# Patient Record
Sex: FEMALE | Race: WHITE | NOT HISPANIC OR LATINO | Employment: STUDENT | URBAN - NONMETROPOLITAN AREA
[De-identification: names, ages, dates, MRNs, and addresses within clinical notes are randomized per-mention and may not be internally consistent; named-entity substitution may affect disease eponyms.]

---
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
## 2023-02-22 ENCOUNTER — TRANSCRIBE ORDERS (OUTPATIENT)
Dept: LAB | Facility: HOSPITAL | Age: 23
End: 2023-02-22
Payer: COMMERCIAL

## 2023-02-22 ENCOUNTER — LAB (OUTPATIENT)
Dept: LAB | Facility: HOSPITAL | Age: 23
End: 2023-02-22
Payer: COMMERCIAL

## 2023-02-22 DIAGNOSIS — N92.6 IRREGULAR MENSTRUAL CYCLE: Primary | ICD-10-CM

## 2023-02-22 DIAGNOSIS — N92.6 IRREGULAR MENSTRUAL CYCLE: ICD-10-CM

## 2023-02-22 LAB
25(OH)D3 SERPL-MCNC: 26.3 NG/ML (ref 30–100)
HCG INTACT+B SERPL-ACNC: 122 MIU/ML
PROGEST SERPL-MCNC: 16.6 NG/ML

## 2023-02-22 PROCEDURE — 84702 CHORIONIC GONADOTROPIN TEST: CPT

## 2023-02-22 PROCEDURE — 36415 COLL VENOUS BLD VENIPUNCTURE: CPT

## 2023-02-22 PROCEDURE — 82306 VITAMIN D 25 HYDROXY: CPT

## 2023-02-22 PROCEDURE — 84144 ASSAY OF PROGESTERONE: CPT

## 2023-02-24 ENCOUNTER — TRANSCRIBE ORDERS (OUTPATIENT)
Dept: LAB | Facility: HOSPITAL | Age: 23
End: 2023-02-24
Payer: COMMERCIAL

## 2023-02-24 ENCOUNTER — LAB (OUTPATIENT)
Dept: LAB | Facility: HOSPITAL | Age: 23
End: 2023-02-24
Payer: COMMERCIAL

## 2023-02-24 DIAGNOSIS — N92.6 IRREGULAR MENSTRUAL CYCLE: ICD-10-CM

## 2023-02-24 DIAGNOSIS — N92.6 IRREGULAR MENSTRUAL CYCLE: Primary | ICD-10-CM

## 2023-02-24 LAB — HCG INTACT+B SERPL-ACNC: 328 MIU/ML

## 2023-02-24 PROCEDURE — 36415 COLL VENOUS BLD VENIPUNCTURE: CPT

## 2023-02-24 PROCEDURE — 84702 CHORIONIC GONADOTROPIN TEST: CPT

## 2023-04-11 ENCOUNTER — LAB (OUTPATIENT)
Dept: LAB | Facility: HOSPITAL | Age: 23
End: 2023-04-11
Payer: COMMERCIAL

## 2023-04-11 ENCOUNTER — TRANSCRIBE ORDERS (OUTPATIENT)
Dept: LAB | Facility: HOSPITAL | Age: 23
End: 2023-04-11
Payer: COMMERCIAL

## 2023-04-11 DIAGNOSIS — Z3A.12 12 WEEKS GESTATION OF PREGNANCY: ICD-10-CM

## 2023-04-11 DIAGNOSIS — Z3A.12 12 WEEKS GESTATION OF PREGNANCY: Primary | ICD-10-CM

## 2023-04-11 LAB
25(OH)D3 SERPL-MCNC: 34 NG/ML (ref 30–100)
ABO GROUP BLD: NORMAL
ALBUMIN SERPL-MCNC: 4 G/DL (ref 3.5–5.2)
ALBUMIN/GLOB SERPL: 1.1 G/DL
ALP SERPL-CCNC: 57 U/L (ref 39–117)
ALT SERPL W P-5'-P-CCNC: 11 U/L (ref 1–33)
ANION GAP SERPL CALCULATED.3IONS-SCNC: 13.9 MMOL/L (ref 5–15)
AST SERPL-CCNC: 18 U/L (ref 1–32)
BACTERIA UR QL AUTO: ABNORMAL /HPF
BILIRUB SERPL-MCNC: 0.3 MG/DL (ref 0–1.2)
BILIRUB UR QL STRIP: NEGATIVE
BLD GP AB SCN SERPL QL: NEGATIVE
BUN SERPL-MCNC: 7 MG/DL (ref 6–20)
BUN/CREAT SERPL: 11.9 (ref 7–25)
CALCIUM SPEC-SCNC: 10 MG/DL (ref 8.6–10.5)
CHLORIDE SERPL-SCNC: 101 MMOL/L (ref 98–107)
CLARITY UR: ABNORMAL
CO2 SERPL-SCNC: 22.1 MMOL/L (ref 22–29)
COLOR UR: YELLOW
CREAT SERPL-MCNC: 0.59 MG/DL (ref 0.57–1)
DEPRECATED RDW RBC AUTO: 44.4 FL (ref 37–54)
EGFRCR SERPLBLD CKD-EPI 2021: 131.7 ML/MIN/1.73
ERYTHROCYTE [DISTWIDTH] IN BLOOD BY AUTOMATED COUNT: 14.6 % (ref 12.3–15.4)
GLOBULIN UR ELPH-MCNC: 3.5 GM/DL
GLUCOSE SERPL-MCNC: 83 MG/DL (ref 65–99)
GLUCOSE UR STRIP-MCNC: NEGATIVE MG/DL
HBV SURFACE AG SERPL QL IA: NORMAL
HCT VFR BLD AUTO: 40.1 % (ref 34–46.6)
HCV AB SER DONR QL: NORMAL
HGB BLD-MCNC: 13.6 G/DL (ref 12–15.9)
HGB UR QL STRIP.AUTO: NEGATIVE
HIV1+2 AB SER QL: NORMAL
HYALINE CASTS UR QL AUTO: ABNORMAL /LPF
KETONES UR QL STRIP: NEGATIVE
LEUKOCYTE ESTERASE UR QL STRIP.AUTO: ABNORMAL
MCH RBC QN AUTO: 28.2 PG (ref 26.6–33)
MCHC RBC AUTO-ENTMCNC: 33.9 G/DL (ref 31.5–35.7)
MCV RBC AUTO: 83.2 FL (ref 79–97)
NITRITE UR QL STRIP: NEGATIVE
PH UR STRIP.AUTO: 7 [PH] (ref 5–8)
PLATELET # BLD AUTO: 413 10*3/MM3 (ref 140–450)
PMV BLD AUTO: 10.2 FL (ref 6–12)
POTASSIUM SERPL-SCNC: 4 MMOL/L (ref 3.5–5.2)
PROT SERPL-MCNC: 7.5 G/DL (ref 6–8.5)
PROT UR QL STRIP: NEGATIVE
RBC # BLD AUTO: 4.82 10*6/MM3 (ref 3.77–5.28)
RBC # UR STRIP: ABNORMAL /HPF
REF LAB TEST METHOD: ABNORMAL
RH BLD: POSITIVE
SODIUM SERPL-SCNC: 137 MMOL/L (ref 136–145)
SP GR UR STRIP: 1.01 (ref 1–1.03)
SQUAMOUS #/AREA URNS HPF: ABNORMAL /HPF
UROBILINOGEN UR QL STRIP: ABNORMAL
WBC # UR STRIP: ABNORMAL /HPF
WBC NRBC COR # BLD: 11.24 10*3/MM3 (ref 3.4–10.8)

## 2023-04-11 PROCEDURE — 86592 SYPHILIS TEST NON-TREP QUAL: CPT

## 2023-04-11 PROCEDURE — 86901 BLOOD TYPING SEROLOGIC RH(D): CPT

## 2023-04-11 PROCEDURE — 87491 CHLMYD TRACH DNA AMP PROBE: CPT

## 2023-04-11 PROCEDURE — 82306 VITAMIN D 25 HYDROXY: CPT

## 2023-04-11 PROCEDURE — 81001 URINALYSIS AUTO W/SCOPE: CPT

## 2023-04-11 PROCEDURE — 85027 COMPLETE CBC AUTOMATED: CPT

## 2023-04-11 PROCEDURE — 87086 URINE CULTURE/COLONY COUNT: CPT

## 2023-04-11 PROCEDURE — 86803 HEPATITIS C AB TEST: CPT

## 2023-04-11 PROCEDURE — 80053 COMPREHEN METABOLIC PANEL: CPT

## 2023-04-11 PROCEDURE — 87591 N.GONORRHOEAE DNA AMP PROB: CPT

## 2023-04-11 PROCEDURE — 86762 RUBELLA ANTIBODY: CPT

## 2023-04-11 PROCEDURE — 36415 COLL VENOUS BLD VENIPUNCTURE: CPT

## 2023-04-11 PROCEDURE — 87340 HEPATITIS B SURFACE AG IA: CPT

## 2023-04-11 PROCEDURE — 86850 RBC ANTIBODY SCREEN: CPT

## 2023-04-11 PROCEDURE — 86900 BLOOD TYPING SEROLOGIC ABO: CPT

## 2023-04-11 PROCEDURE — G0432 EIA HIV-1/HIV-2 SCREEN: HCPCS

## 2023-04-12 LAB
BACTERIA SPEC AEROBE CULT: NORMAL
RPR SER QL: NORMAL
RUBV IGG SERPL IA-ACNC: 4.36 INDEX

## 2023-04-13 LAB — BACTERIA SPEC AEROBE CULT: NORMAL

## 2023-04-15 LAB
C TRACH RRNA SPEC QL NAA+PROBE: NEGATIVE
N GONORRHOEA RRNA SPEC QL NAA+PROBE: NEGATIVE

## 2023-06-15 ENCOUNTER — TRANSCRIBE ORDERS (OUTPATIENT)
Dept: OBSTETRICS AND GYNECOLOGY | Facility: HOSPITAL | Age: 23
End: 2023-06-15
Payer: COMMERCIAL

## 2023-06-15 DIAGNOSIS — E66.9 SUPER OBESITY: ICD-10-CM

## 2023-06-15 DIAGNOSIS — E28.2 PCOS (POLYCYSTIC OVARIAN SYNDROME): ICD-10-CM

## 2023-06-15 DIAGNOSIS — Z36.3 ANTENATAL SCREENING FOR MALFORMATION USING ULTRASONICS: Primary | ICD-10-CM

## 2023-06-15 DIAGNOSIS — O10.919 HTN IN PREGNANCY, CHRONIC: ICD-10-CM

## 2023-07-12 PROBLEM — E28.2 PCOS (POLYCYSTIC OVARIAN SYNDROME): Status: ACTIVE | Noted: 2023-07-12

## 2023-07-12 PROBLEM — E66.01 MORBID OBESITY WITH BMI OF 50.0-59.9, ADULT: Status: ACTIVE | Noted: 2023-07-12

## 2023-07-12 PROBLEM — O10.919 CHRONIC HYPERTENSION AFFECTING PREGNANCY: Status: ACTIVE | Noted: 2023-07-12

## 2023-07-12 PROBLEM — Z82.79 FAMILY HISTORY OF OTHER CONGENITAL MALFORMATIONS, DEFORMATIONS AND CHROMOSOMAL ABNORMALITIES: Status: ACTIVE | Noted: 2023-07-12

## 2023-08-01 ENCOUNTER — OFFICE VISIT (OUTPATIENT)
Dept: OBSTETRICS AND GYNECOLOGY | Facility: HOSPITAL | Age: 23
End: 2023-08-01
Payer: COMMERCIAL

## 2023-08-01 ENCOUNTER — HOSPITAL ENCOUNTER (OUTPATIENT)
Dept: WOMENS IMAGING | Facility: HOSPITAL | Age: 23
Discharge: HOME | End: 2023-08-01
Payer: COMMERCIAL

## 2023-08-01 ENCOUNTER — LAB (OUTPATIENT)
Dept: LAB | Facility: HOSPITAL | Age: 23
End: 2023-08-01
Payer: COMMERCIAL

## 2023-08-01 VITALS — WEIGHT: 293 LBS | DIASTOLIC BLOOD PRESSURE: 74 MMHG | SYSTOLIC BLOOD PRESSURE: 127 MMHG

## 2023-08-01 DIAGNOSIS — E28.2 PCOS (POLYCYSTIC OVARIAN SYNDROME): ICD-10-CM

## 2023-08-01 DIAGNOSIS — E28.2 PCOS (POLYCYSTIC OVARIAN SYNDROME): Primary | ICD-10-CM

## 2023-08-01 DIAGNOSIS — E66.01 MORBID OBESITY WITH BMI OF 50.0-59.9, ADULT: ICD-10-CM

## 2023-08-01 DIAGNOSIS — Z3A.27 27 WEEKS GESTATION OF PREGNANCY: ICD-10-CM

## 2023-08-01 DIAGNOSIS — O10.919 CHRONIC HYPERTENSION AFFECTING PREGNANCY: ICD-10-CM

## 2023-08-01 DIAGNOSIS — Z82.79 FAMILY HISTORY OF OTHER CONGENITAL MALFORMATIONS, DEFORMATIONS AND CHROMOSOMAL ABNORMALITIES: ICD-10-CM

## 2023-08-01 LAB
ALP SERPL-CCNC: 78 U/L (ref 39–117)
ALT SERPL W P-5'-P-CCNC: 11 U/L (ref 1–33)
AST SERPL-CCNC: 13 U/L (ref 1–32)
BILIRUB BLD-MCNC: NEGATIVE MG/DL
BILIRUB SERPL-MCNC: 0.2 MG/DL (ref 0–1.2)
CLARITY, POC: CLEAR
COLOR UR: YELLOW
CREAT SERPL-MCNC: 0.51 MG/DL (ref 0.57–1)
DEPRECATED RDW RBC AUTO: 40.3 FL (ref 37–54)
ERYTHROCYTE [DISTWIDTH] IN BLOOD BY AUTOMATED COUNT: 13 % (ref 12.3–15.4)
GLUCOSE UR STRIP-MCNC: NEGATIVE MG/DL
HCT VFR BLD AUTO: 35.3 % (ref 34–46.6)
HGB BLD-MCNC: 12.1 G/DL (ref 12–15.9)
KETONES UR QL: NEGATIVE
LDH SERPL-CCNC: 102 U/L (ref 135–214)
LEUKOCYTE EST, POC: NEGATIVE
MCH RBC QN AUTO: 29.3 PG (ref 26.6–33)
MCHC RBC AUTO-ENTMCNC: 34.3 G/DL (ref 31.5–35.7)
MCV RBC AUTO: 85.5 FL (ref 79–97)
NITRITE UR-MCNC: NEGATIVE MG/ML
PH UR: 7.5 [PH] (ref 5–8)
PLATELET # BLD AUTO: 425 10*3/MM3 (ref 140–450)
PMV BLD AUTO: 10.5 FL (ref 6–12)
PROT UR STRIP-MCNC: NEGATIVE MG/DL
RBC # BLD AUTO: 4.13 10*6/MM3 (ref 3.77–5.28)
RBC # UR STRIP: NEGATIVE /UL
SP GR UR: 1 (ref 1–1.03)
URATE SERPL-MCNC: 3.3 MG/DL (ref 2.4–5.7)
UROBILINOGEN UR QL: NORMAL
WBC NRBC COR # BLD: 12.26 10*3/MM3 (ref 3.4–10.8)

## 2023-08-01 PROCEDURE — 84450 TRANSFERASE (AST) (SGOT): CPT | Performed by: OBSTETRICS & GYNECOLOGY

## 2023-08-01 PROCEDURE — 76816 OB US FOLLOW-UP PER FETUS: CPT

## 2023-08-01 PROCEDURE — 84460 ALANINE AMINO (ALT) (SGPT): CPT | Performed by: OBSTETRICS & GYNECOLOGY

## 2023-08-01 PROCEDURE — 83615 LACTATE (LD) (LDH) ENZYME: CPT | Performed by: OBSTETRICS & GYNECOLOGY

## 2023-08-01 PROCEDURE — 82565 ASSAY OF CREATININE: CPT | Performed by: OBSTETRICS & GYNECOLOGY

## 2023-08-01 PROCEDURE — 36415 COLL VENOUS BLD VENIPUNCTURE: CPT | Performed by: OBSTETRICS & GYNECOLOGY

## 2023-08-01 PROCEDURE — 84550 ASSAY OF BLOOD/URIC ACID: CPT | Performed by: OBSTETRICS & GYNECOLOGY

## 2023-08-01 PROCEDURE — 82247 BILIRUBIN TOTAL: CPT | Performed by: OBSTETRICS & GYNECOLOGY

## 2023-08-01 PROCEDURE — 84075 ASSAY ALKALINE PHOSPHATASE: CPT | Performed by: OBSTETRICS & GYNECOLOGY

## 2023-08-01 PROCEDURE — 85027 COMPLETE CBC AUTOMATED: CPT

## 2023-08-29 ENCOUNTER — HOSPITAL ENCOUNTER (OUTPATIENT)
Dept: WOMENS IMAGING | Facility: HOSPITAL | Age: 23
Discharge: HOME | End: 2023-08-29
Admitting: OBSTETRICS & GYNECOLOGY
Payer: COMMERCIAL

## 2023-08-29 ENCOUNTER — OFFICE VISIT (OUTPATIENT)
Dept: OBSTETRICS AND GYNECOLOGY | Facility: HOSPITAL | Age: 23
End: 2023-08-29
Payer: COMMERCIAL

## 2023-08-29 VITALS — WEIGHT: 293 LBS | DIASTOLIC BLOOD PRESSURE: 65 MMHG | SYSTOLIC BLOOD PRESSURE: 142 MMHG

## 2023-08-29 DIAGNOSIS — O10.919 CHRONIC HYPERTENSION AFFECTING PREGNANCY: ICD-10-CM

## 2023-08-29 DIAGNOSIS — O36.63X0 MACROSOMIA OF FETUS AFFECTING MANAGEMENT OF MOTHER IN THIRD TRIMESTER, SINGLE OR UNSPECIFIED FETUS: ICD-10-CM

## 2023-08-29 DIAGNOSIS — E66.01 MORBID OBESITY WITH BMI OF 50.0-59.9, ADULT: ICD-10-CM

## 2023-08-29 DIAGNOSIS — E28.2 PCOS (POLYCYSTIC OVARIAN SYNDROME): ICD-10-CM

## 2023-08-29 DIAGNOSIS — Z82.79 FAMILY HISTORY OF OTHER CONGENITAL MALFORMATIONS, DEFORMATIONS AND CHROMOSOMAL ABNORMALITIES: ICD-10-CM

## 2023-08-29 DIAGNOSIS — E66.01 MORBID OBESITY WITH BMI OF 50.0-59.9, ADULT: Primary | ICD-10-CM

## 2023-08-29 PROCEDURE — 76816 OB US FOLLOW-UP PER FETUS: CPT

## 2023-08-29 NOTE — PROGRESS NOTES
Patient seen in Maternal Fetal Medicine clinic today. Please see full note in under imaging tab of patient chart in Epic (Viewpoint report).    Kenzie Luna MD

## 2023-09-27 ENCOUNTER — LAB (OUTPATIENT)
Dept: LAB | Facility: HOSPITAL | Age: 23
End: 2023-09-27
Payer: COMMERCIAL

## 2023-09-27 ENCOUNTER — HOSPITAL ENCOUNTER (OUTPATIENT)
Facility: HOSPITAL | Age: 23
Discharge: HOME | End: 2023-09-27
Attending: OBSTETRICS & GYNECOLOGY | Admitting: OBSTETRICS & GYNECOLOGY
Payer: COMMERCIAL

## 2023-09-27 ENCOUNTER — HOSPITAL ENCOUNTER (OUTPATIENT)
Dept: WOMENS IMAGING | Facility: HOSPITAL | Age: 23
Discharge: HOME | End: 2023-09-27
Payer: COMMERCIAL

## 2023-09-27 ENCOUNTER — OFFICE VISIT (OUTPATIENT)
Dept: OBSTETRICS AND GYNECOLOGY | Facility: HOSPITAL | Age: 23
End: 2023-09-27
Payer: COMMERCIAL

## 2023-09-27 VITALS
BODY MASS INDEX: 45.99 KG/M2 | HEIGHT: 67 IN | RESPIRATION RATE: 20 BRPM | DIASTOLIC BLOOD PRESSURE: 77 MMHG | HEART RATE: 94 BPM | WEIGHT: 293 LBS | SYSTOLIC BLOOD PRESSURE: 144 MMHG | TEMPERATURE: 98.6 F

## 2023-09-27 VITALS — WEIGHT: 293 LBS | DIASTOLIC BLOOD PRESSURE: 71 MMHG | SYSTOLIC BLOOD PRESSURE: 158 MMHG

## 2023-09-27 DIAGNOSIS — O10.919 CHRONIC HYPERTENSION AFFECTING PREGNANCY: ICD-10-CM

## 2023-09-27 DIAGNOSIS — E66.01 MORBID OBESITY WITH BMI OF 50.0-59.9, ADULT: ICD-10-CM

## 2023-09-27 DIAGNOSIS — O36.63X0 MACROSOMIA OF FETUS AFFECTING MANAGEMENT OF MOTHER IN THIRD TRIMESTER, SINGLE OR UNSPECIFIED FETUS: ICD-10-CM

## 2023-09-27 DIAGNOSIS — O10.919 CHRONIC HYPERTENSION AFFECTING PREGNANCY: Primary | ICD-10-CM

## 2023-09-27 LAB
ALP SERPL-CCNC: 103 U/L (ref 39–117)
ALT SERPL W P-5'-P-CCNC: 15 U/L (ref 1–33)
AST SERPL-CCNC: 25 U/L (ref 1–32)
BASOPHILS # BLD AUTO: 0.03 10*3/MM3 (ref 0–0.2)
BASOPHILS NFR BLD AUTO: 0.3 % (ref 0–1.5)
BILIRUB BLD-MCNC: NEGATIVE MG/DL
BILIRUB SERPL-MCNC: 0.2 MG/DL (ref 0–1.2)
CLARITY, POC: CLEAR
COLOR UR: YELLOW
CREAT SERPL-MCNC: 0.64 MG/DL (ref 0.57–1)
DEPRECATED RDW RBC AUTO: 40.5 FL (ref 37–54)
DEPRECATED RDW RBC AUTO: 42.4 FL (ref 37–54)
EOSINOPHIL # BLD AUTO: 0.07 10*3/MM3 (ref 0–0.4)
EOSINOPHIL NFR BLD AUTO: 0.7 % (ref 0.3–6.2)
ERYTHROCYTE [DISTWIDTH] IN BLOOD BY AUTOMATED COUNT: 13.3 % (ref 12.3–15.4)
ERYTHROCYTE [DISTWIDTH] IN BLOOD BY AUTOMATED COUNT: 13.6 % (ref 12.3–15.4)
EXPIRATION DATE: NORMAL
GLUCOSE UR STRIP-MCNC: NEGATIVE MG/DL
HCT VFR BLD AUTO: 34.7 % (ref 34–46.6)
HCT VFR BLD AUTO: 34.8 % (ref 34–46.6)
HGB BLD-MCNC: 11.4 G/DL (ref 12–15.9)
HGB BLD-MCNC: 11.5 G/DL (ref 12–15.9)
IMM GRANULOCYTES # BLD AUTO: 0.03 10*3/MM3 (ref 0–0.05)
IMM GRANULOCYTES NFR BLD AUTO: 0.3 % (ref 0–0.5)
KETONES UR QL: NEGATIVE
LDH SERPL-CCNC: 180 U/L (ref 135–214)
LEUKOCYTE EST, POC: NEGATIVE
LYMPHOCYTES # BLD AUTO: 2.16 10*3/MM3 (ref 0.7–3.1)
LYMPHOCYTES NFR BLD AUTO: 21 % (ref 19.6–45.3)
Lab: NORMAL
MCH RBC QN AUTO: 28.1 PG (ref 26.6–33)
MCH RBC QN AUTO: 28.4 PG (ref 26.6–33)
MCHC RBC AUTO-ENTMCNC: 32.9 G/DL (ref 31.5–35.7)
MCHC RBC AUTO-ENTMCNC: 33 G/DL (ref 31.5–35.7)
MCV RBC AUTO: 85.1 FL (ref 79–97)
MCV RBC AUTO: 86.5 FL (ref 79–97)
MONOCYTES # BLD AUTO: 0.97 10*3/MM3 (ref 0.1–0.9)
MONOCYTES NFR BLD AUTO: 9.4 % (ref 5–12)
NEUTROPHILS NFR BLD AUTO: 68.3 % (ref 42.7–76)
NEUTROPHILS NFR BLD AUTO: 7.03 10*3/MM3 (ref 1.7–7)
NITRITE UR-MCNC: NEGATIVE MG/ML
NRBC BLD AUTO-RTO: 0 /100 WBC (ref 0–0.2)
PH UR: 7.5 [PH] (ref 5–8)
PLATELET # BLD AUTO: 315 10*3/MM3 (ref 140–450)
PLATELET # BLD AUTO: 343 10*3/MM3 (ref 140–450)
PMV BLD AUTO: 10.3 FL (ref 6–12)
PMV BLD AUTO: 11.8 FL (ref 6–12)
PROT UR STRIP-MCNC: NEGATIVE MG/DL
PROT UR STRIP-MCNC: NEGATIVE MG/DL
RBC # BLD AUTO: 4.01 10*6/MM3 (ref 3.77–5.28)
RBC # BLD AUTO: 4.09 10*6/MM3 (ref 3.77–5.28)
RBC # UR STRIP: NEGATIVE /UL
SP GR UR: 1 (ref 1–1.03)
URATE SERPL-MCNC: 4.1 MG/DL (ref 2.4–5.7)
UROBILINOGEN UR QL: NORMAL
WBC NRBC COR # BLD: 10.29 10*3/MM3 (ref 3.4–10.8)
WBC NRBC COR # BLD: 11.06 10*3/MM3 (ref 3.4–10.8)

## 2023-09-27 PROCEDURE — 81002 URINALYSIS NONAUTO W/O SCOPE: CPT | Performed by: STUDENT IN AN ORGANIZED HEALTH CARE EDUCATION/TRAINING PROGRAM

## 2023-09-27 PROCEDURE — 84156 ASSAY OF PROTEIN URINE: CPT

## 2023-09-27 PROCEDURE — 76816 OB US FOLLOW-UP PER FETUS: CPT

## 2023-09-27 PROCEDURE — 85027 COMPLETE CBC AUTOMATED: CPT | Performed by: STUDENT IN AN ORGANIZED HEALTH CARE EDUCATION/TRAINING PROGRAM

## 2023-09-27 PROCEDURE — 84450 TRANSFERASE (AST) (SGOT): CPT | Performed by: OBSTETRICS & GYNECOLOGY

## 2023-09-27 PROCEDURE — 84460 ALANINE AMINO (ALT) (SGPT): CPT | Performed by: OBSTETRICS & GYNECOLOGY

## 2023-09-27 PROCEDURE — G0463 HOSPITAL OUTPT CLINIC VISIT: HCPCS

## 2023-09-27 PROCEDURE — 84550 ASSAY OF BLOOD/URIC ACID: CPT | Performed by: OBSTETRICS & GYNECOLOGY

## 2023-09-27 PROCEDURE — 82570 ASSAY OF URINE CREATININE: CPT

## 2023-09-27 PROCEDURE — 76819 FETAL BIOPHYS PROFIL W/O NST: CPT

## 2023-09-27 PROCEDURE — 84075 ASSAY ALKALINE PHOSPHATASE: CPT | Performed by: OBSTETRICS & GYNECOLOGY

## 2023-09-27 PROCEDURE — 36415 COLL VENOUS BLD VENIPUNCTURE: CPT | Performed by: OBSTETRICS & GYNECOLOGY

## 2023-09-27 PROCEDURE — 59025 FETAL NON-STRESS TEST: CPT

## 2023-09-27 PROCEDURE — 82247 BILIRUBIN TOTAL: CPT | Performed by: OBSTETRICS & GYNECOLOGY

## 2023-09-27 PROCEDURE — 82565 ASSAY OF CREATININE: CPT | Performed by: OBSTETRICS & GYNECOLOGY

## 2023-09-27 PROCEDURE — 85025 COMPLETE CBC W/AUTO DIFF WBC: CPT | Performed by: OBSTETRICS & GYNECOLOGY

## 2023-09-27 PROCEDURE — 83615 LACTATE (LD) (LDH) ENZYME: CPT | Performed by: OBSTETRICS & GYNECOLOGY

## 2023-09-27 RX ORDER — CYCLOBENZAPRINE HCL 10 MG
10 TABLET ORAL
COMMUNITY
Start: 2023-09-26

## 2023-09-27 RX ORDER — LANCETS 28 GAUGE
EACH MISCELLANEOUS
COMMUNITY
Start: 2023-08-08

## 2023-09-27 RX ORDER — BLOOD-GLUCOSE METER
KIT MISCELLANEOUS
COMMUNITY
Start: 2023-08-08 | End: 2023-10-03 | Stop reason: HOSPADM

## 2023-09-27 RX ORDER — NIFEDIPINE 30 MG/1
30 TABLET, EXTENDED RELEASE ORAL 2 TIMES DAILY
Qty: 60 TABLET | Refills: 2 | Status: SHIPPED | OUTPATIENT
Start: 2023-09-27 | End: 2023-10-03 | Stop reason: HOSPADM

## 2023-09-27 RX ORDER — BLOOD-GLUCOSE METER
KIT MISCELLANEOUS SEE ADMIN INSTRUCTIONS
COMMUNITY
Start: 2023-08-08

## 2023-09-27 RX ORDER — MAGNESIUM OXIDE 400 MG/1
400 TABLET ORAL DAILY
COMMUNITY
End: 2023-10-03 | Stop reason: HOSPADM

## 2023-09-27 RX ORDER — NIFEDIPINE 30 MG/1
30 TABLET, EXTENDED RELEASE ORAL
Status: COMPLETED | OUTPATIENT
Start: 2023-09-27 | End: 2023-09-27

## 2023-09-27 RX ADMIN — NIFEDIPINE 30 MG: 30 TABLET, FILM COATED, EXTENDED RELEASE ORAL at 21:51

## 2023-09-27 NOTE — PROGRESS NOTES
Pt reports bp high yesterday due to her not taking bp meds on time, Next f/u with Galdino on       NIPT low risk   Pt reports scheduled P c/s for 10/13/23 with Dr Ahmadi - due to maternal and fetal weight and increased bp

## 2023-09-27 NOTE — LETTER
2023       No Recipients    Patient: Nelli Viera   YOB: 2000   Date of Visit: 2023       Dear Qian Reese MD,    Thank you for referring Nelli Viera to me for evaluation. Below is a copy of my consult note.    If you have questions, please do not hesitate to call me. I look forward to following Nelli along with you.         Sincerely,        Aidan Devlin MD        CC:   No Recipients    Pt reports bp high yesterday due to her not taking bp meds on time, Next f/u with Galdino on       NIPT low risk   Pt reports scheduled P c/s for 10/13/23 with Dr Ahmadi - due to maternal and fetal weight and increased bp         Maternal/Fetal Medicine Consult Note   Date: 2023  Name: Nelli Viera    : 2000     MRN: [id number]     Referring Provider: No ref. provider found    Chief Complaint  CHTN, SO S>d    Subjective     History of Present Illness:  Nelli Viera is a 23 y.o.  35w5d who presents today for follow-up up growth ultrasound for chronic hypertension and obesity.    Patient states she overall feels well today.  Denies contractions, leaking of fluid, vaginal bleeding.  Having normal fetal movement.  States she had elevated blood pressures at home yesterday secondary to late administration of her medications.  Patient states that she had a blood pressure in the 160s at her doctor's appointment yesterday but on repeat was mild.  She denies current headache, changes in vision.  Does report an increase in swelling over the past couple days and has noticed some intermittent right upper quadrant pain.    ENRRIQUE: Estimated Date of Delivery: 10/27/23     ROS:   Otherwise Noted in HPI      Current Outpatient Medications:   •  labetalol (NORMODYNE) 100 MG tablet, Take 1 tablet by mouth 2 (Two) Times a Day., Disp: , Rfl:   •  DULoxetine (CYMBALTA) 60 MG capsule, Take 1 capsule by mouth Daily., Disp: , Rfl:   •  Prenatal Vit-Fe Fumarate-FA (prenatal vitamin  "27-0.8) 27-0.8 MG tablet tablet, Take 1 tablet by mouth Daily., Disp: , Rfl:     Objective     Vital Signs  /71   Wt (!) 167 kg (368 lb 6.4 oz)   Estimated body mass index is 48.1 kg/m² as calculated from the following:    Height as of 19: 167.6 cm (66\").    Weight as of 19: 135 kg (298 lb).    Ultrasound Impression:   See Viewpoint     Assessment and Plan     Nelli Viera is a 23 y.o.  35w5d who presents today for  follow-up up growth ultrasound for chronic hypertension and obesity.    Diagnoses and all orders for this visit:    1. Chronic hypertension affecting pregnancy (Primary)  Assessment & Plan:  Patient currently on 100 mg of labetalol twice a day.  Her blood pressures today are 153/91 and 158/71.  Her last preeclampsia labs are at the beginning of last month.  Patient without symptoms of preeclampsia currently.  Will send preeclampsia labs today.  Instructed patient to return or call physician with symptoms of preeclampsia and severe range blood pressures.    Orders:  -     POC Urinalysis Dipstick    2. Morbid obesity with BMI of 50.0-59.9, adult  Assessment & Plan:  Growth ultrasound today shows a fetus weighing 3852 g at the 98th percentile and abdominal circumference greater than the 99th percentile.  RYANN of 21.  Normal BPP and umbilical artery Dopplers.  Images remain suboptimal secondary to maternal body habitus.  No further ultrasound follow-up is scheduled at this time.           Follow Up  No further follow-up is scheduled at this time.    I spent 20 minutes caring for the patient on the day of service. This included: obtaining or reviewing a separately obtained medical history, reviewing patient records, performing a medically appropriate exam and/or evaluation, counseling or educating the patient/family/caregiver, ordering medications, labs, and/or procedures and documenting such in the medical record. This does not include time spent on review and interpretation of " other tests such as fetal ultrasound or the performance of other procedures such as amniocentesis or CVS.      Aidan Devlin MD, FACOG  Maternal Fetal Medicine, [facility] [place]

## 2023-09-27 NOTE — PROGRESS NOTES
"    Maternal/Fetal Medicine Consult Note   Date: 2023  Name: Nelli Viera    : 2000     MRN: [id number]     Referring Provider: No ref. provider found    Chief Complaint  CHTN, SO S>d    Subjective     History of Present Illness:  Nelli Viera is a 23 y.o.  35w5d who presents today for follow-up up growth ultrasound for chronic hypertension and obesity.    Patient states she overall feels well today.  Denies contractions, leaking of fluid, vaginal bleeding.  Having normal fetal movement.  States she had elevated blood pressures at home yesterday secondary to late administration of her medications.  Patient states that she had a blood pressure in the 160s at her doctor's appointment yesterday but on repeat was mild.  She denies current headache, changes in vision.  Does report an increase in swelling over the past couple days and has noticed some intermittent right upper quadrant pain.    ENRRIQUE: Estimated Date of Delivery: 10/27/23     ROS:   Otherwise Noted in HPI      Current Outpatient Medications:     labetalol (NORMODYNE) 100 MG tablet, Take 1 tablet by mouth 2 (Two) Times a Day., Disp: , Rfl:     DULoxetine (CYMBALTA) 60 MG capsule, Take 1 capsule by mouth Daily., Disp: , Rfl:     Prenatal Vit-Fe Fumarate-FA (prenatal vitamin 27-0.8) 27-0.8 MG tablet tablet, Take 1 tablet by mouth Daily., Disp: , Rfl:     Objective     Vital Signs  /71   Wt (!) 167 kg (368 lb 6.4 oz)   Estimated body mass index is 48.1 kg/m² as calculated from the following:    Height as of 19: 167.6 cm (66\").    Weight as of 19: 135 kg (298 lb).    Ultrasound Impression:   See Viewpoint     Assessment and Plan     Nelli Viera is a 23 y.o.  35w5d who presents today for  follow-up up growth ultrasound for chronic hypertension and obesity.    Diagnoses and all orders for this visit:    1. Chronic hypertension affecting pregnancy (Primary)  Assessment & Plan:  Patient currently on 100 mg of " labetalol twice a day.  Her blood pressures today are 153/91 and 158/71.  Her last preeclampsia labs are at the beginning of last month.  Patient without symptoms of preeclampsia currently.  Will send preeclampsia labs today.  Instructed patient to return or call physician with symptoms of preeclampsia and severe range blood pressures.    Orders:  -     POC Urinalysis Dipstick    2. Morbid obesity with BMI of 50.0-59.9, adult  Assessment & Plan:  Growth ultrasound today shows a fetus weighing 3852 g at the 98th percentile and abdominal circumference greater than the 99th percentile.  RYANN of 21.  Normal BPP and umbilical artery Dopplers.  Images remain suboptimal secondary to maternal body habitus.  No further ultrasound follow-up is scheduled at this time.           Follow Up  No further follow-up is scheduled at this time.    I spent 20 minutes caring for the patient on the day of service. This included: obtaining or reviewing a separately obtained medical history, reviewing patient records, performing a medically appropriate exam and/or evaluation, counseling or educating the patient/family/caregiver, ordering medications, labs, and/or procedures and documenting such in the medical record. This does not include time spent on review and interpretation of other tests such as fetal ultrasound or the performance of other procedures such as amniocentesis or CVS.      Aidan Devlin MD, FACOG  Maternal Fetal Medicine, [facility] [place]

## 2023-09-27 NOTE — ASSESSMENT & PLAN NOTE
Growth ultrasound today shows a fetus weighing 3852 g at the 98th percentile and abdominal circumference greater than the 99th percentile.  RYANN of 21.  Normal BPP and umbilical artery Dopplers.  Images remain suboptimal secondary to maternal body habitus.  No further ultrasound follow-up is scheduled at this time.

## 2023-09-27 NOTE — ASSESSMENT & PLAN NOTE
Patient currently on 100 mg of labetalol twice a day.  Her blood pressures today are 153/91 and 158/71.  Her last preeclampsia labs are at the beginning of last month.  Patient without symptoms of preeclampsia currently.  Will send preeclampsia labs today.  Instructed patient to return or call physician with symptoms of preeclampsia and severe range blood pressures.

## 2023-09-27 NOTE — LETTER
September 27, 2023     Patient: Nelli Viera   YOB: 2000   Date of Visit: 09/27/2023       To Whom It May Concern:    Please excuse Akil Driver his presence was required during the hospitalization of Nelli Viera            Sincerely,        ELISEO Ott RN     CC:   No Recipients

## 2023-09-28 LAB
CREAT UR-MCNC: 41.2 MG/DL
PROT ?TM UR-MCNC: 9.3 MG/DL
PROT/CREAT UR: 225.7 MG/G CREA (ref 0–200)

## 2023-09-28 RX ORDER — NIFEDIPINE 30 MG/1
30 TABLET, EXTENDED RELEASE ORAL 2 TIMES DAILY
Qty: 30 TABLET | Refills: 2 | Status: ON HOLD | OUTPATIENT
Start: 2023-09-28

## 2023-09-28 NOTE — DISCHARGE SUMMARY
[facility]  Discharge Summary    Patient: Nelli Viera            : 2000  MRN: [id number]  CSN: [id number]      Gestational Age at Discharge:  35w5d      Admission  Diagnosis: chronic hypertension of pregnancy    Discharge Diagnosis:   Patient Active Problem List   Diagnosis    Attention deficit disorder (ADD)    Depression    Dysmenorrhea    Elevated fasting glucose    Oppositional defiant disorder    Chronic hypertension affecting pregnancy    Morbid obesity with BMI of 50.0-59.9, adult    PCOS (polycystic ovarian syndrome)    Family history of other congenital malformations, deformations and chromosomal abnormalities       Date of Admission: 2023    Date of Discharge: 23    Service:  Obstetrics    Hospital Course:      [facility]  Obstetric Discharge Summary    Chief Complaint   Patient presents with    Elevated Blood Pressure     Pt reports BP at home was 166/102        Subjective     Patient is a 23 y.o. female  currently at 35w5d, who presented for elevated blood pressures. Pregnancy complicated by chronic hypertension controlled with Labetalol 200mg BID to this point and AMA. Seen at [facility] today and labs drawn. Patient monitored following elevated blood pressures at home.     Patients blood pressures remained non-severe during period of monitoring. Lab workup indicates no end organ damage. Patient denies worsening headache, visual changes, shortness of breath, RUQ pain.     Plan for discharge home with continued Labetalol 200mg BID and initiation of Nifedipine 30mg XL BID.  Reviewed need for better blood pressures control. Advised on importance of compliance with medication and appropriate administration intervals. Reviewed return with elevated blood pressures to severe range 160/100. Also discussed return with unrelenting headaches, visual changes, chest pain, shortness of breath, RUQ pain. Also discussed return with consistent contractions, LOF, vaginal bleeding,  decreased fetal movement. Plan for follow up on 9/29    Objective       Vital Signs Range for the last 24 hours  Temperature: Temp:  [98.6 °F (37 °C)] 98.6 °F (37 °C)   Temp Source: Temp src: Oral   BP: BP: (148-158)/() 148/94   Pulse: Heart Rate:  [94] 94   Respirations: Resp:  [20] 20   SPO2:     O2 Amount (l/min):     O2 Devices     Weight: Weight:  [167 kg (368 lb 6.4 oz)] 167 kg (368 lb 6.4 oz)         Fetal Heart Rate Assessment   Method:  External   Beats/min:     Baseline:  130bpm   Varibility:  moderate   Accels:  present   Decels:  absent   Tracing Category:  I     Uterine Assessment   Method:  External   Frequency (min):  Quiet   Ctx Count in 10 min:     Duration:     Intensity:     Intensity by IUPC:     Resting Tone:     Resting Tone by IUPC:     [place] Units:         Labs:    Lab Results (last 24 hours)       Procedure Component Value Units Date/Time    POC Protein, Urine, Qualitative, Dipstick [[id number]] Collected: 09/27/23 2023    Specimen: Urine Updated: 09/27/23 2024     Protein, POC Negative mg/dL      Lot Number [id number]     Expiration Date 04/30/2024    CBC (No Diff) [[id number]]  (Abnormal) Collected: 09/27/23 2003    Specimen: Blood Updated: 09/27/23 2013     WBC 11.06 10*3/mm3      RBC 4.01 10*6/mm3      Hemoglobin 11.4 g/dL      Hematocrit 34.7 %      MCV 86.5 fL      MCH 28.4 pg      MCHC 32.9 g/dL      RDW 13.6 %      RDW-SD 42.4 fl      MPV 10.3 fL      Platelets 315 10*3/mm3           HOSPITAL LABS:  Lab Results   Component Value Date    WBC 11.06 (H) 09/27/2023    HGB 11.4 (L) 09/27/2023    HCT 34.7 09/27/2023    MCV 86.5 09/27/2023     09/27/2023    URICACID 4.1 09/27/2023    AST 25 09/27/2023    ALT 15 09/27/2023     09/27/2023           IMAGING        Discharge Medications     Discharge Medications        New Medications        Instructions Start Date   NIFEdipine XL 30 MG 24 hr tablet  Commonly known as: Procardia XL   30 mg, Oral, 2 Times Daily              Continue These Medications        Instructions Start Date   cyclobenzaprine 10 MG tablet  Commonly known as: FLEXERIL   10 mg      DULoxetine 60 MG capsule  Commonly known as: CYMBALTA   60 mg, Oral, Daily      freestyle lancets   USE TO CHECK GLUCOSE 4 TIMES DAILY      FREESTYLE LITE test strip  Generic drug: glucose blood   USE STRIP TO CHECK GLUCOSE 4 TIMES DAILY      FreeStyle Lite w/Device kit   See Admin Instructions      labetalol 100 MG tablet  Commonly known as: NORMODYNE   200 mg, Oral, 2 Times Daily      magnesium oxide 400 MG tablet  Commonly known as: MAG-OX   400 mg, Oral, Daily      prenatal vitamin 27-0.8 27-0.8 MG tablet tablet   1 tablet, Oral, Daily               Allergies:   Allergies   Allergen Reactions    Bactrim [Sulfamethoxazole-Trimethoprim] Hives    Penicillins Other (See Comments)     States went to hospital and they wouldn't give it to her for strep     Adhesive Tape Rash    Amoxicillin Other (See Comments)     As a child    Other Rash     TB SERUM        Discharge Disposition:  To Home    Discharge Condition:  Stable    Discharge Diet: Regular    Activity at Discharge: rest, hydration, follow up as listed above    Follow-up Appointments: 9/29/23        Ramila Mc DO  09/27/23  21:33 EDT

## 2023-09-28 NOTE — H&P
[facility]  Obstetric History and Physical    Chief Complaint   Patient presents with    Elevated Blood Pressure     Pt reports BP at home was 166/102        Subjective     Patient is a 23 y.o. female  currently at 35w5d, who presents for elevated blood pressures at home. Pregnancy has been complicated by chronic hypertension and obesity. Patient has been following with [facility] and was seen today in office. Blood pressures were 150s/90s and labs were ordered. She has been taking Labetalol 200mg BID. Plan for delivery has been by  at 37 weeks due to hypertensive disease and accelerated fetal growth.    She notes on intake that she has occasional headaches. Has not used tylenol but uses caffeine for relief. She has been on irregular sleeping schedule due to partner being on third shift so doses of labetalol have been at 6am and 3pm today. Denies chest pain, shortness of breath, RUQ pain. She denies leakage of fluid, vaginal bleeding. Good fetal movement.     The following portions of the patients history were reviewed and updated as appropriate: current medications, allergies, past medical history, past surgical history, past family history, past social history, and problem list .       Prenatal Information:  Prenatal Results       Initial Prenatal Labs       Test Value Reference Range Date Time    Hemoglobin  11.4 g/dL 12.0 - 15.9 23 0909       13.6 g/dL 12.0 - 15.9 23 1203    Hematocrit  33.1 % 34.0 - 46.6 23 0909       40.1 % 34.0 - 46.6 23 1203    Platelets  408 10*3/mm3 140 - 450 23 0909       413 10*3/mm3 140 - 450 23 1203    Rubella IgG  4.36 index Immune >0.99 23 1203    Hepatitis B SAg  Non-Reactive  Non-Reactive 23 1203    Hepatitis C Ab  Non-Reactive  Non-Reactive 23 1203    RPR  Non-Reactive  Non-Reactive 23 1203    T. Pallidum Ab         ABO  O   23 1203    Rh  Positive   23 1203    Antibody Screen  Negative   23  1203    HIV  Non-Reactive  Non-Reactive 23 1203    Urine Culture  25,000 CFU/mL Normal Urogenital Charlene   23 1203       <25,000 CFU/mL Normal Urogenital Charlene   23 1203    Gonorrhea  Negative  Negative 23 1203    Chlamydia  Negative  Negative 23 1203    TSH        HgB A1c         Varicella IgG        HgB Electrophoresis         Cystic fibrosis                   Fetal testing        Test Value Reference Range Date Time    NIPT        MSAFP        AFP-4                  2nd and 3rd Trimester       Test Value Reference Range Date Time    Hemoglobin (repeated)  12.1 g/dL 12.0 - 15.9 23 1142       11.5 g/dL 12.0 - 15.9 23 1538    Hematocrit (repeated)  35.3 % 34.0 - 46.6 23 1142       34.2 % 34.0 - 46.6 23 1538    Platelets   425 10*3/mm3 140 - 450 23 1142       410 10*3/mm3 140 - 450 23 1538       408 10*3/mm3 140 - 450 23 0909       413 10*3/mm3 140 - 450 23 1203    GCT  101 mg/dL 65 - 139 23 1629    Antibody Screen (repeated)  Negative   23 1540       Negative   23 1203    GTT Fasting        GTT 1 Hr        GTT 2 Hr        GTT 3 Hr        Group B Strep                  Other testing        Test Value Reference Range Date Time    [place] IgG         CMV IgG                   Drug Screening       Test Value Reference Range Date Time    Amphetamine Screen        Barbiturate Screen        Benzodiazepine Screen        Methadone Screen        Phencyclidine Screen        Opiates Screen        THC Screen        Cocaine Screen        Propoxyphene Screen        Buprenorphine Screen        Methamphetamine Screen        Oxycodone Screen        Tricyclic Antidepressants Screen                  Legend    ^: Historical                               Past OB History:       OB History    Para Term  AB Living   1 0 0 0 0 0   SAB IAB Ectopic Molar Multiple Live Births   0 0 0 0 0 0      # Outcome Date GA Lbr Harish/2nd Weight Sex  "Delivery Anes PTL Lv   1 Current                Past Medical History: Past Medical History:   Diagnosis Date    Anxiety     Attention deficit disorder (ADD)     Autism     Depression     Elevated fasting glucose     History of panic attacks     r/t loss of control as child and teen (went to foster care as teen)    History of suicide attempt     as a teen by overdose of bactrim and other meds- \"probably 10 times\". Pt says it was related to feeling helpless with mom as a child. States she no longer has those thoughts.    Hypertension     chronic    Oppositional defiant disorder     as a teen    PCOS (polycystic ovarian syndrome)     PTSD (post-traumatic stress disorder)     r/t \"trauma induced by mom\"      Past Surgical History Past Surgical History:   Procedure Laterality Date    BARTHOLIN GLAND CYST EXCISION      muliple removed    TONSILLECTOMY      and adenoids as child      Family History: No family history on file.   Social History:  reports that she has quit smoking. Her smoking use included cigarettes. She has never used smokeless tobacco.   reports that she does not currently use alcohol.   reports no history of drug use.        REVIEW OF SYSTEMS              Reports fetal movement is normal             Denies leakage of amniotic fluid.             Denies vaginal bleeding             She reports No contractions             All other systems reviewed and are negative    Objective       Vital Signs Range for the last 24 hours  Temperature: Temp:  [98.6 °F (37 °C)] 98.6 °F (37 °C)   Temp Source: Temp src: Oral   BP: BP: (148-158)/() 148/94   Pulse: Heart Rate:  [94] 94   Respirations: Resp:  [20] 20   SPO2:     O2 Amount (l/min):     O2 Devices     Weight: Weight:  [167 kg (368 lb 6.4 oz)] 167 kg (368 lb 6.4 oz)         Fetal Heart Rate Assessment   Method:  External   Beats/min:     Baseline:  130bpm   Varibility:  moderate   Accels:  present   Decels:  absent   Tracing Category:  I     Uterine Assessment "   Method:  External   Frequency (min):  Quiet   Ctx Count in 10 min:     Duration:     Intensity:     Intensity by IUPC:     Resting Tone:     Resting Tone by IUPC:     [place] Units:       Constitutional:  Well developed, well nourished, no acute distress, well-groomed.   Respiratory:  Lungs are clear to auscultation bilaterally, normal breath sounds.   Cardiovascular:  Normal rate and rhythm, no murmurs.   Gastrointestinal:  Soft, gravid, nontender.  Uterus: Soft, nontender. Fundus appropriate for dates.  Neurologic:  Alert & oriented x 3,  no focal deficits noted.   Psychiatric:  Speech and behavior appropriate.   Extremities: trace LE edema, no cyanosis, clubbing, no evidence of DVT.        Labs:  Lab Results   Component Value Date    WBC 11.06 (H) 09/27/2023    HGB 11.4 (L) 09/27/2023    HCT 34.7 09/27/2023    MCV 86.5 09/27/2023     09/27/2023    URICACID 4.1 09/27/2023    AST 25 09/27/2023    ALT 15 09/27/2023     09/27/2023               Assessment & Plan         Assessment:  IUP at 35w5d weeks gestation with reactive fetal status.    2.   Chronic hypertension-controlled on labetalol 200mg BID  4.   GBS status: Unknown  5.   Rh: O+/-    Plan:  Plan for serial BP monitoring  Labs collected by PDC today reviewed, no evidence of end organ damage or changes  Urin dip for protein: negative  Continuous FHT and TOCO monitoring. FHT Cat I and quiet TOCO.  Discussed workup for superimposed preeclampsia   Will establish POC following period of monitoring        Ramila Mc DO  9/27/2023  20:24 EDT

## 2023-09-29 ENCOUNTER — HOSPITAL ENCOUNTER (INPATIENT)
Facility: HOSPITAL | Age: 23
LOS: 4 days | Discharge: HOME | End: 2023-10-03
Attending: OBSTETRICS & GYNECOLOGY | Admitting: OBSTETRICS & GYNECOLOGY
Payer: COMMERCIAL

## 2023-09-29 ENCOUNTER — ANESTHESIA (OUTPATIENT)
Dept: LABOR AND DELIVERY | Facility: HOSPITAL | Age: 23
End: 2023-09-29
Payer: COMMERCIAL

## 2023-09-29 ENCOUNTER — ANESTHESIA EVENT (OUTPATIENT)
Dept: LABOR AND DELIVERY | Facility: HOSPITAL | Age: 23
End: 2023-09-29
Payer: COMMERCIAL

## 2023-09-29 ENCOUNTER — HOSPITAL ENCOUNTER (OUTPATIENT)
Facility: HOSPITAL | Age: 23
Setting detail: OBSERVATION
Discharge: HOME | End: 2023-09-29
Attending: OBSTETRICS & GYNECOLOGY | Admitting: OBSTETRICS & GYNECOLOGY
Payer: COMMERCIAL

## 2023-09-29 VITALS
RESPIRATION RATE: 20 BRPM | TEMPERATURE: 99 F | SYSTOLIC BLOOD PRESSURE: 137 MMHG | DIASTOLIC BLOOD PRESSURE: 84 MMHG | HEART RATE: 102 BPM | OXYGEN SATURATION: 96 %

## 2023-09-29 DIAGNOSIS — O10.913 CHRONIC HYPERTENSION COMPLICATING OR REASON FOR CARE DURING PREGNANCY, THIRD TRIMESTER: Primary | ICD-10-CM

## 2023-09-29 PROBLEM — Z34.90 TERM PREGNANCY: Status: ACTIVE | Noted: 2023-09-29

## 2023-09-29 LAB
ABO GROUP BLD: NORMAL
ALP SERPL-CCNC: 101 U/L (ref 39–117)
ALP SERPL-CCNC: 106 U/L (ref 39–117)
ALP SERPL-CCNC: 93 U/L (ref 39–117)
ALT SERPL W P-5'-P-CCNC: 10 U/L (ref 1–33)
ALT SERPL W P-5'-P-CCNC: 15 U/L (ref 1–33)
ALT SERPL W P-5'-P-CCNC: 15 U/L (ref 1–33)
AST SERPL-CCNC: 20 U/L (ref 1–32)
AST SERPL-CCNC: 22 U/L (ref 1–32)
AST SERPL-CCNC: 28 U/L (ref 1–32)
ATMOSPHERIC PRESS: ABNORMAL MM[HG]
ATMOSPHERIC PRESS: ABNORMAL MM[HG]
BASE EXCESS BLDCOA CALC-SCNC: -4.9 MMOL/L (ref 0–2)
BASE EXCESS BLDCOV CALC-SCNC: -3.3 MMOL/L (ref 0–2)
BDY SITE: ABNORMAL
BDY SITE: ABNORMAL
BILIRUB SERPL-MCNC: 0.2 MG/DL (ref 0–1.2)
BILIRUB SERPL-MCNC: 0.3 MG/DL (ref 0–1.2)
BILIRUB SERPL-MCNC: <0.2 MG/DL (ref 0–1.2)
BLD GP AB SCN SERPL QL: NEGATIVE
BODY TEMPERATURE: 37 C
BODY TEMPERATURE: 37 C
CO2 BLDA-SCNC: 28.5 MMOL/L (ref 22–33)
CO2 BLDA-SCNC: 28.8 MMOL/L (ref 22–33)
CREAT SERPL-MCNC: 0.59 MG/DL (ref 0.57–1)
CREAT SERPL-MCNC: 0.62 MG/DL (ref 0.57–1)
CREAT SERPL-MCNC: 0.65 MG/DL (ref 0.57–1)
DEPRECATED RDW RBC AUTO: 42.6 FL (ref 37–54)
DEPRECATED RDW RBC AUTO: 43 FL (ref 37–54)
EPAP: 0
EPAP: 0
ERYTHROCYTE [DISTWIDTH] IN BLOOD BY AUTOMATED COUNT: 13.6 % (ref 12.3–15.4)
ERYTHROCYTE [DISTWIDTH] IN BLOOD BY AUTOMATED COUNT: 13.7 % (ref 12.3–15.4)
EXPIRATION DATE: NORMAL
HCO3 BLDCOA-SCNC: 26.5 MMOL/L (ref 16.9–20.5)
HCO3 BLDCOV-SCNC: 26.5 MMOL/L (ref 18.6–21.4)
HCT VFR BLD AUTO: 34.4 % (ref 34–46.6)
HCT VFR BLD AUTO: 35.6 % (ref 34–46.6)
HGB BLD-MCNC: 11.4 G/DL (ref 12–15.9)
HGB BLD-MCNC: 11.8 G/DL (ref 12–15.9)
HGB BLDA-MCNC: 18.1 G/DL (ref 14–18)
HGB BLDA-MCNC: 18.3 G/DL (ref 14–18)
INHALED O2 CONCENTRATION: 21 %
INHALED O2 CONCENTRATION: 21 %
IPAP: 0
IPAP: 0
LDH SERPL-CCNC: 140 U/L (ref 135–214)
LDH SERPL-CCNC: 162 U/L (ref 135–214)
LDH SERPL-CCNC: 288 U/L (ref 135–214)
Lab: ABNORMAL
Lab: ABNORMAL
Lab: NORMAL
MCH RBC QN AUTO: 28.8 PG (ref 26.6–33)
MCH RBC QN AUTO: 28.9 PG (ref 26.6–33)
MCHC RBC AUTO-ENTMCNC: 33.1 G/DL (ref 31.5–35.7)
MCHC RBC AUTO-ENTMCNC: 33.1 G/DL (ref 31.5–35.7)
MCV RBC AUTO: 86.9 FL (ref 79–97)
MCV RBC AUTO: 87.3 FL (ref 79–97)
MODALITY: ABNORMAL
MODALITY: ABNORMAL
NOTIFIED BY: ABNORMAL
NOTIFIED BY: ABNORMAL
NOTIFIED WHO: ABNORMAL
NOTIFIED WHO: ABNORMAL
PAW @ PEAK INSP FLOW SETTING VENT: 0 CMH2O
PAW @ PEAK INSP FLOW SETTING VENT: 0 CMH2O
PCO2 BLDCOA: 75.3 MMHG (ref 43.3–54.9)
PCO2 BLDCOV: 64.7 MM HG (ref 28–40)
PH BLDCOA: 7.16 PH UNITS (ref 7.22–7.3)
PH BLDCOV: 7.22 PH UNITS (ref 7.31–7.37)
PLATELET # BLD AUTO: 334 10*3/MM3 (ref 140–450)
PLATELET # BLD AUTO: 343 10*3/MM3 (ref 140–450)
PMV BLD AUTO: 10.5 FL (ref 6–12)
PMV BLD AUTO: 9.9 FL (ref 6–12)
PO2 BLDCOA: 16.9 MMHG (ref 11.5–43.3)
PO2 BLDCOV: 20.4 MM HG (ref 21–31)
PROT UR STRIP-MCNC: NEGATIVE MG/DL
RBC # BLD AUTO: 3.96 10*6/MM3 (ref 3.77–5.28)
RBC # BLD AUTO: 4.08 10*6/MM3 (ref 3.77–5.28)
RH BLD: POSITIVE
SAO2 % BLDCOA: 20 %
SAO2 % BLDCOA: ABNORMAL %
SAO2 % BLDCOV: 33 %
T&S EXPIRATION DATE: NORMAL
TOTAL RATE: 0 BREATHS/MINUTE
TOTAL RATE: 0 BREATHS/MINUTE
URATE SERPL-MCNC: 3.8 MG/DL (ref 2.4–5.7)
URATE SERPL-MCNC: 4.2 MG/DL (ref 2.4–5.7)
URATE SERPL-MCNC: 4.4 MG/DL (ref 2.4–5.7)
WBC NRBC COR # BLD: 10.93 10*3/MM3 (ref 3.4–10.8)
WBC NRBC COR # BLD: 9.8 10*3/MM3 (ref 3.4–10.8)

## 2023-09-29 PROCEDURE — 25010000002 PHENYLEPHRINE 10 MG/ML SOLUTION: Performed by: ANESTHESIOLOGY

## 2023-09-29 PROCEDURE — 25010000002 MORPHINE PER 10 MG: Performed by: ANESTHESIOLOGY

## 2023-09-29 PROCEDURE — 25010000002 ONDANSETRON PER 1 MG: Performed by: ANESTHESIOLOGY

## 2023-09-29 PROCEDURE — G0463 HOSPITAL OUTPT CLINIC VISIT: HCPCS

## 2023-09-29 PROCEDURE — 94799 UNLISTED PULMONARY SVC/PX: CPT

## 2023-09-29 PROCEDURE — 84550 ASSAY OF BLOOD/URIC ACID: CPT | Performed by: OBSTETRICS & GYNECOLOGY

## 2023-09-29 PROCEDURE — 0 MAGNESIUM SULFATE 20 GM/500ML SOLUTION: Performed by: OBSTETRICS & GYNECOLOGY

## 2023-09-29 PROCEDURE — 25010000002 VANCOMYCIN 10 G RECONSTITUTED SOLUTION: Performed by: OBSTETRICS & GYNECOLOGY

## 2023-09-29 PROCEDURE — 81002 URINALYSIS NONAUTO W/O SCOPE: CPT | Performed by: OBSTETRICS & GYNECOLOGY

## 2023-09-29 PROCEDURE — 25010000002 FENTANYL CITRATE (PF) 50 MCG/ML SOLUTION: Performed by: ANESTHESIOLOGY

## 2023-09-29 PROCEDURE — 88307 TISSUE EXAM BY PATHOLOGIST: CPT | Performed by: OBSTETRICS & GYNECOLOGY

## 2023-09-29 PROCEDURE — 82565 ASSAY OF CREATININE: CPT | Performed by: OBSTETRICS & GYNECOLOGY

## 2023-09-29 PROCEDURE — 86900 BLOOD TYPING SEROLOGIC ABO: CPT | Performed by: OBSTETRICS & GYNECOLOGY

## 2023-09-29 PROCEDURE — 84450 TRANSFERASE (AST) (SGOT): CPT | Performed by: OBSTETRICS & GYNECOLOGY

## 2023-09-29 PROCEDURE — 83615 LACTATE (LD) (LDH) ENZYME: CPT | Performed by: OBSTETRICS & GYNECOLOGY

## 2023-09-29 PROCEDURE — 82805 BLOOD GASES W/O2 SATURATION: CPT

## 2023-09-29 PROCEDURE — 25010000002 OXYTOCIN PER 10 UNITS: Performed by: ANESTHESIOLOGY

## 2023-09-29 PROCEDURE — 86901 BLOOD TYPING SEROLOGIC RH(D): CPT | Performed by: OBSTETRICS & GYNECOLOGY

## 2023-09-29 PROCEDURE — 84460 ALANINE AMINO (ALT) (SGPT): CPT | Performed by: OBSTETRICS & GYNECOLOGY

## 2023-09-29 PROCEDURE — 85027 COMPLETE CBC AUTOMATED: CPT | Performed by: OBSTETRICS & GYNECOLOGY

## 2023-09-29 PROCEDURE — 84075 ASSAY ALKALINE PHOSPHATASE: CPT | Performed by: OBSTETRICS & GYNECOLOGY

## 2023-09-29 PROCEDURE — 25010000002 KETOROLAC TROMETHAMINE PER 15 MG: Performed by: OBSTETRICS & GYNECOLOGY

## 2023-09-29 PROCEDURE — 25010000002 METOCLOPRAMIDE PER 10 MG: Performed by: ANESTHESIOLOGY

## 2023-09-29 PROCEDURE — 25010000002 GENTAMICIN PER 80 MG: Performed by: OBSTETRICS & GYNECOLOGY

## 2023-09-29 PROCEDURE — G0378 HOSPITAL OBSERVATION PER HR: HCPCS

## 2023-09-29 PROCEDURE — 82247 BILIRUBIN TOTAL: CPT | Performed by: OBSTETRICS & GYNECOLOGY

## 2023-09-29 PROCEDURE — 86850 RBC ANTIBODY SCREEN: CPT | Performed by: OBSTETRICS & GYNECOLOGY

## 2023-09-29 PROCEDURE — 59025 FETAL NON-STRESS TEST: CPT

## 2023-09-29 RX ORDER — FAMOTIDINE 10 MG/ML
INJECTION, SOLUTION INTRAVENOUS AS NEEDED
Status: DISCONTINUED | OUTPATIENT
Start: 2023-09-29 | End: 2023-09-29 | Stop reason: SURG

## 2023-09-29 RX ORDER — ACETAMINOPHEN 325 MG/1
650 TABLET ORAL EVERY 6 HOURS
Status: DISCONTINUED | OUTPATIENT
Start: 2023-10-01 | End: 2023-10-03 | Stop reason: HOSPADM

## 2023-09-29 RX ORDER — SODIUM CHLORIDE 0.9 % (FLUSH) 0.9 %
10 SYRINGE (ML) INJECTION AS NEEDED
Status: DISCONTINUED | OUTPATIENT
Start: 2023-09-29 | End: 2023-10-03 | Stop reason: HOSPADM

## 2023-09-29 RX ORDER — CARBOPROST TROMETHAMINE 250 UG/ML
250 INJECTION, SOLUTION INTRAMUSCULAR AS NEEDED
Status: DISCONTINUED | OUTPATIENT
Start: 2023-09-29 | End: 2023-10-03 | Stop reason: HOSPADM

## 2023-09-29 RX ORDER — HYDROXYZINE HYDROCHLORIDE 25 MG/1
50 TABLET, FILM COATED ORAL EVERY 6 HOURS PRN
Status: DISCONTINUED | OUTPATIENT
Start: 2023-09-29 | End: 2023-10-03 | Stop reason: HOSPADM

## 2023-09-29 RX ORDER — SODIUM CHLORIDE 9 MG/ML
40 INJECTION, SOLUTION INTRAVENOUS AS NEEDED
Status: DISCONTINUED | OUTPATIENT
Start: 2023-09-29 | End: 2023-10-03 | Stop reason: HOSPADM

## 2023-09-29 RX ORDER — BUPIVACAINE HYDROCHLORIDE 7.5 MG/ML
INJECTION, SOLUTION INTRASPINAL
Status: COMPLETED | OUTPATIENT
Start: 2023-09-29 | End: 2023-09-29

## 2023-09-29 RX ORDER — CITRIC ACID/SODIUM CITRATE 334-500MG
30 SOLUTION, ORAL ORAL ONCE
Status: COMPLETED | OUTPATIENT
Start: 2023-09-29 | End: 2023-09-29

## 2023-09-29 RX ORDER — TRANEXAMIC ACID 10 MG/ML
INJECTION, SOLUTION INTRAVENOUS AS NEEDED
Status: DISCONTINUED | OUTPATIENT
Start: 2023-09-29 | End: 2023-09-29 | Stop reason: SURG

## 2023-09-29 RX ORDER — PHENYLEPHRINE HYDROCHLORIDE 10 MG/ML
INJECTION INTRAVENOUS AS NEEDED
Status: DISCONTINUED | OUTPATIENT
Start: 2023-09-29 | End: 2023-09-29 | Stop reason: SURG

## 2023-09-29 RX ORDER — CITRIC ACID/SODIUM CITRATE 334-500MG
30 SOLUTION, ORAL ORAL ONCE
Status: DISCONTINUED | OUTPATIENT
Start: 2023-09-29 | End: 2023-09-29

## 2023-09-29 RX ORDER — ONDANSETRON 2 MG/ML
4 INJECTION INTRAMUSCULAR; INTRAVENOUS EVERY 6 HOURS PRN
Status: DISCONTINUED | OUTPATIENT
Start: 2023-09-29 | End: 2023-10-03 | Stop reason: HOSPADM

## 2023-09-29 RX ORDER — OXYCODONE HYDROCHLORIDE 10 MG/1
10 TABLET ORAL EVERY 4 HOURS PRN
Status: DISCONTINUED | OUTPATIENT
Start: 2023-09-29 | End: 2023-10-03 | Stop reason: HOSPADM

## 2023-09-29 RX ORDER — OXYTOCIN/0.9 % SODIUM CHLORIDE 30/500 ML
85 PLASTIC BAG, INJECTION (ML) INTRAVENOUS ONCE
Status: DISCONTINUED | OUTPATIENT
Start: 2023-09-29 | End: 2023-10-03 | Stop reason: HOSPADM

## 2023-09-29 RX ORDER — MISOPROSTOL 200 UG/1
800 TABLET ORAL AS NEEDED
Status: DISCONTINUED | OUTPATIENT
Start: 2023-09-29 | End: 2023-10-03 | Stop reason: HOSPADM

## 2023-09-29 RX ORDER — METHYLERGONOVINE MALEATE 0.2 MG/ML
200 INJECTION INTRAVENOUS ONCE AS NEEDED
Status: DISCONTINUED | OUTPATIENT
Start: 2023-09-29 | End: 2023-10-03 | Stop reason: HOSPADM

## 2023-09-29 RX ORDER — ACETAMINOPHEN 500 MG
1000 TABLET ORAL EVERY 6 HOURS
Status: COMPLETED | OUTPATIENT
Start: 2023-09-30 | End: 2023-09-30

## 2023-09-29 RX ORDER — OXYTOCIN 10 [USP'U]/ML
INJECTION, SOLUTION INTRAMUSCULAR; INTRAVENOUS AS NEEDED
Status: DISCONTINUED | OUTPATIENT
Start: 2023-09-29 | End: 2023-09-29 | Stop reason: SURG

## 2023-09-29 RX ORDER — OXYTOCIN/0.9 % SODIUM CHLORIDE 30/500 ML
650 PLASTIC BAG, INJECTION (ML) INTRAVENOUS ONCE
Status: DISCONTINUED | OUTPATIENT
Start: 2023-09-29 | End: 2023-10-03 | Stop reason: HOSPADM

## 2023-09-29 RX ORDER — ONDANSETRON 2 MG/ML
INJECTION INTRAMUSCULAR; INTRAVENOUS AS NEEDED
Status: DISCONTINUED | OUTPATIENT
Start: 2023-09-29 | End: 2023-09-29 | Stop reason: SURG

## 2023-09-29 RX ORDER — PRENATAL VIT/IRON FUM/FOLIC AC 27MG-0.8MG
1 TABLET ORAL DAILY
Status: DISCONTINUED | OUTPATIENT
Start: 2023-09-30 | End: 2023-10-03 | Stop reason: HOSPADM

## 2023-09-29 RX ORDER — OXYCODONE HYDROCHLORIDE 5 MG/1
5 TABLET ORAL EVERY 4 HOURS PRN
Status: DISCONTINUED | OUTPATIENT
Start: 2023-09-29 | End: 2023-10-03 | Stop reason: HOSPADM

## 2023-09-29 RX ORDER — MAGNESIUM SULFATE HEPTAHYDRATE 40 MG/ML
2 INJECTION, SOLUTION INTRAVENOUS CONTINUOUS
Status: DISPENSED | OUTPATIENT
Start: 2023-09-29 | End: 2023-10-02

## 2023-09-29 RX ORDER — ENOXAPARIN SODIUM 100 MG/ML
40 INJECTION SUBCUTANEOUS EVERY 12 HOURS
Status: DISCONTINUED | OUTPATIENT
Start: 2023-09-30 | End: 2023-10-03 | Stop reason: HOSPADM

## 2023-09-29 RX ORDER — LABETALOL HYDROCHLORIDE 5 MG/ML
20 INJECTION, SOLUTION INTRAVENOUS ONCE
Status: COMPLETED | OUTPATIENT
Start: 2023-09-29 | End: 2023-09-29

## 2023-09-29 RX ORDER — METHYLERGONOVINE MALEATE 0.2 MG/ML
200 INJECTION INTRAVENOUS AS NEEDED
Status: DISCONTINUED | OUTPATIENT
Start: 2023-09-29 | End: 2023-10-03 | Stop reason: HOSPADM

## 2023-09-29 RX ORDER — KETOROLAC TROMETHAMINE 30 MG/ML
30 INJECTION, SOLUTION INTRAMUSCULAR; INTRAVENOUS ONCE
Status: COMPLETED | OUTPATIENT
Start: 2023-09-29 | End: 2023-09-29

## 2023-09-29 RX ORDER — FENTANYL CITRATE 50 UG/ML
INJECTION, SOLUTION INTRAMUSCULAR; INTRAVENOUS
Status: COMPLETED | OUTPATIENT
Start: 2023-09-29 | End: 2023-09-29

## 2023-09-29 RX ORDER — METRONIDAZOLE 500 MG/1
500 TABLET ORAL EVERY 8 HOURS SCHEDULED
Status: DISCONTINUED | OUTPATIENT
Start: 2023-09-29 | End: 2023-09-30

## 2023-09-29 RX ORDER — SODIUM CHLORIDE 0.9 % (FLUSH) 0.9 %
10 SYRINGE (ML) INJECTION EVERY 12 HOURS SCHEDULED
Status: DISCONTINUED | OUTPATIENT
Start: 2023-09-30 | End: 2023-10-03 | Stop reason: HOSPADM

## 2023-09-29 RX ORDER — MORPHINE SULFATE 1 MG/ML
INJECTION, SOLUTION EPIDURAL; INTRATHECAL; INTRAVENOUS AS NEEDED
Status: DISCONTINUED | OUTPATIENT
Start: 2023-09-29 | End: 2023-09-29 | Stop reason: SURG

## 2023-09-29 RX ORDER — KETOROLAC TROMETHAMINE 15 MG/ML
15 INJECTION, SOLUTION INTRAMUSCULAR; INTRAVENOUS EVERY 6 HOURS
Status: COMPLETED | OUTPATIENT
Start: 2023-09-30 | End: 2023-09-30

## 2023-09-29 RX ORDER — DOCUSATE SODIUM 100 MG/1
100 CAPSULE, LIQUID FILLED ORAL 2 TIMES DAILY PRN
Status: DISCONTINUED | OUTPATIENT
Start: 2023-09-29 | End: 2023-10-03 | Stop reason: HOSPADM

## 2023-09-29 RX ORDER — ACETAMINOPHEN 325 MG/1
650 TABLET ORAL ONCE
Status: COMPLETED | OUTPATIENT
Start: 2023-09-29 | End: 2023-09-29

## 2023-09-29 RX ORDER — ACETAMINOPHEN 500 MG
1000 TABLET ORAL ONCE
Status: DISCONTINUED | OUTPATIENT
Start: 2023-09-29 | End: 2023-09-30 | Stop reason: SDUPTHER

## 2023-09-29 RX ORDER — SODIUM CHLORIDE, SODIUM LACTATE, POTASSIUM CHLORIDE, CALCIUM CHLORIDE 600; 310; 30; 20 MG/100ML; MG/100ML; MG/100ML; MG/100ML
INJECTION, SOLUTION INTRAVENOUS CONTINUOUS PRN
Status: DISCONTINUED | OUTPATIENT
Start: 2023-09-29 | End: 2023-09-29 | Stop reason: SURG

## 2023-09-29 RX ORDER — IBUPROFEN 600 MG/1
600 TABLET ORAL EVERY 6 HOURS
Status: DISCONTINUED | OUTPATIENT
Start: 2023-10-01 | End: 2023-10-03 | Stop reason: HOSPADM

## 2023-09-29 RX ORDER — HYDROCORTISONE 25 MG/G
1 CREAM TOPICAL AS NEEDED
Status: DISCONTINUED | OUTPATIENT
Start: 2023-09-29 | End: 2023-10-03 | Stop reason: HOSPADM

## 2023-09-29 RX ORDER — ONDANSETRON 4 MG/1
4 TABLET, FILM COATED ORAL EVERY 6 HOURS PRN
Status: DISCONTINUED | OUTPATIENT
Start: 2023-09-29 | End: 2023-10-03 | Stop reason: HOSPADM

## 2023-09-29 RX ORDER — SODIUM CHLORIDE 0.9 % (FLUSH) 0.9 %
10 SYRINGE (ML) INJECTION EVERY 12 HOURS SCHEDULED
Status: DISCONTINUED | OUTPATIENT
Start: 2023-09-29 | End: 2023-10-03 | Stop reason: HOSPADM

## 2023-09-29 RX ORDER — BUTALBITAL, ACETAMINOPHEN AND CAFFEINE 50; 325; 40 MG/1; MG/1; MG/1
2 TABLET ORAL EVERY 4 HOURS PRN
Status: DISCONTINUED | OUTPATIENT
Start: 2023-09-29 | End: 2023-10-03 | Stop reason: HOSPADM

## 2023-09-29 RX ORDER — MISOPROSTOL 200 UG/1
600 TABLET ORAL AS NEEDED
Status: DISCONTINUED | OUTPATIENT
Start: 2023-09-29 | End: 2023-10-03 | Stop reason: HOSPADM

## 2023-09-29 RX ORDER — SODIUM CHLORIDE, SODIUM LACTATE, POTASSIUM CHLORIDE, CALCIUM CHLORIDE 600; 310; 30; 20 MG/100ML; MG/100ML; MG/100ML; MG/100ML
125 INJECTION, SOLUTION INTRAVENOUS CONTINUOUS
Status: DISCONTINUED | OUTPATIENT
Start: 2023-09-29 | End: 2023-10-03 | Stop reason: HOSPADM

## 2023-09-29 RX ORDER — OXYCODONE HYDROCHLORIDE AND ACETAMINOPHEN 5; 325 MG/1; MG/1
1 TABLET ORAL EVERY 4 HOURS PRN
Status: DISCONTINUED | OUTPATIENT
Start: 2023-09-29 | End: 2023-09-30 | Stop reason: SDUPTHER

## 2023-09-29 RX ORDER — CALCIUM CARBONATE 500 MG/1
1 TABLET, CHEWABLE ORAL EVERY 4 HOURS PRN
Status: DISCONTINUED | OUTPATIENT
Start: 2023-09-29 | End: 2023-10-03 | Stop reason: HOSPADM

## 2023-09-29 RX ORDER — SIMETHICONE 80 MG
80 TABLET,CHEWABLE ORAL 4 TIMES DAILY PRN
Status: DISCONTINUED | OUTPATIENT
Start: 2023-09-29 | End: 2023-10-03 | Stop reason: HOSPADM

## 2023-09-29 RX ORDER — CARBOPROST TROMETHAMINE 250 UG/ML
INJECTION, SOLUTION INTRAMUSCULAR
Status: COMPLETED
Start: 2023-09-29 | End: 2023-09-29

## 2023-09-29 RX ORDER — ALUMINA, MAGNESIA, AND SIMETHICONE 2400; 2400; 240 MG/30ML; MG/30ML; MG/30ML
15 SUSPENSION ORAL EVERY 4 HOURS PRN
Status: DISCONTINUED | OUTPATIENT
Start: 2023-09-29 | End: 2023-10-03 | Stop reason: HOSPADM

## 2023-09-29 RX ORDER — SODIUM CHLORIDE, SODIUM LACTATE, POTASSIUM CHLORIDE, CALCIUM CHLORIDE 600; 310; 30; 20 MG/100ML; MG/100ML; MG/100ML; MG/100ML
75 INJECTION, SOLUTION INTRAVENOUS CONTINUOUS
Status: DISCONTINUED | OUTPATIENT
Start: 2023-09-29 | End: 2023-10-03 | Stop reason: HOSPADM

## 2023-09-29 RX ORDER — OXYTOCIN/0.9 % SODIUM CHLORIDE 30/500 ML
125 PLASTIC BAG, INJECTION (ML) INTRAVENOUS CONTINUOUS PRN
Status: DISCONTINUED | OUTPATIENT
Start: 2023-09-29 | End: 2023-10-03 | Stop reason: HOSPADM

## 2023-09-29 RX ORDER — METOCLOPRAMIDE HYDROCHLORIDE 5 MG/ML
INJECTION INTRAMUSCULAR; INTRAVENOUS AS NEEDED
Status: DISCONTINUED | OUTPATIENT
Start: 2023-09-29 | End: 2023-09-29 | Stop reason: SURG

## 2023-09-29 RX ORDER — LIDOCAINE HYDROCHLORIDE 10 MG/ML
0.5 INJECTION, SOLUTION EPIDURAL; INFILTRATION; INTRACAUDAL; PERINEURAL ONCE AS NEEDED
Status: DISCONTINUED | OUTPATIENT
Start: 2023-09-29 | End: 2023-10-03 | Stop reason: HOSPADM

## 2023-09-29 RX ADMIN — CARBOPROST TROMETHAMINE 250 MCG: 250 INJECTION, SOLUTION INTRAMUSCULAR at 17:40

## 2023-09-29 RX ADMIN — SODIUM CHLORIDE, POTASSIUM CHLORIDE, SODIUM LACTATE AND CALCIUM CHLORIDE 75 ML/HR: 600; 310; 30; 20 INJECTION, SOLUTION INTRAVENOUS at 15:15

## 2023-09-29 RX ADMIN — MORPHINE SULFATE 0.3 MG: 1 INJECTION, SOLUTION EPIDURAL; INTRATHECAL; INTRAVENOUS at 17:00

## 2023-09-29 RX ADMIN — GENTAMICIN SULFATE 440 MG: 40 INJECTION, SOLUTION INTRAMUSCULAR; INTRAVENOUS at 18:45

## 2023-09-29 RX ADMIN — BUPIVACAINE HYDROCHLORIDE IN DEXTROSE 1.6 ML: 7.5 INJECTION, SOLUTION SUBARACHNOID at 17:00

## 2023-09-29 RX ADMIN — BUTALBITAL, ACETAMINOPHEN, AND CAFFEINE 2 TABLET: 50; 325; 40 TABLET ORAL at 12:04

## 2023-09-29 RX ADMIN — FAMOTIDINE 20 MG: 10 INJECTION, SOLUTION INTRAVENOUS at 17:17

## 2023-09-29 RX ADMIN — SODIUM CHLORIDE, POTASSIUM CHLORIDE, SODIUM LACTATE AND CALCIUM CHLORIDE: 600; 310; 30; 20 INJECTION, SOLUTION INTRAVENOUS at 16:33

## 2023-09-29 RX ADMIN — METOCLOPRAMIDE 10 MG: 5 INJECTION, SOLUTION INTRAMUSCULAR; INTRAVENOUS at 17:17

## 2023-09-29 RX ADMIN — PHENYLEPHRINE HYDROCHLORIDE 300 MCG: 10 INJECTION, SOLUTION INTRAVENOUS at 17:28

## 2023-09-29 RX ADMIN — SODIUM CHLORIDE, POTASSIUM CHLORIDE, SODIUM LACTATE AND CALCIUM CHLORIDE 1000 ML: 600; 310; 30; 20 INJECTION, SOLUTION INTRAVENOUS at 16:37

## 2023-09-29 RX ADMIN — TRANEXAMIC ACID 1000 MG: 10 INJECTION, SOLUTION INTRAVENOUS at 17:36

## 2023-09-29 RX ADMIN — PHENYLEPHRINE HYDROCHLORIDE 200 MCG: 10 INJECTION, SOLUTION INTRAVENOUS at 17:18

## 2023-09-29 RX ADMIN — PHENYLEPHRINE HYDROCHLORIDE 300 MCG: 10 INJECTION, SOLUTION INTRAVENOUS at 17:10

## 2023-09-29 RX ADMIN — VANCOMYCIN HYDROCHLORIDE 3000 MG: 10 INJECTION, POWDER, LYOPHILIZED, FOR SOLUTION INTRAVENOUS at 16:32

## 2023-09-29 RX ADMIN — PHENYLEPHRINE HYDROCHLORIDE 200 MCG: 10 INJECTION, SOLUTION INTRAVENOUS at 17:22

## 2023-09-29 RX ADMIN — FENTANYL CITRATE 25 MCG: 50 INJECTION, SOLUTION INTRAMUSCULAR; INTRAVENOUS at 17:00

## 2023-09-29 RX ADMIN — KETOROLAC TROMETHAMINE 30 MG: 30 INJECTION, SOLUTION INTRAMUSCULAR; INTRAVENOUS at 18:58

## 2023-09-29 RX ADMIN — Medication 20 MG: at 15:36

## 2023-09-29 RX ADMIN — ONDANSETRON 4 MG: 2 INJECTION INTRAMUSCULAR; INTRAVENOUS at 17:17

## 2023-09-29 RX ADMIN — MAGNESIUM SULFATE IN WATER 2 G/HR: 40 INJECTION, SOLUTION INTRAVENOUS at 16:00

## 2023-09-29 RX ADMIN — OXYTOCIN 30 UNITS: 10 INJECTION, SOLUTION INTRAMUSCULAR; INTRAVENOUS at 17:35

## 2023-09-29 RX ADMIN — SODIUM CITRATE AND CITRIC ACID MONOHYDRATE 30 ML: 500; 334 SOLUTION ORAL at 16:41

## 2023-09-29 RX ADMIN — ACETAMINOPHEN 650 MG: 325 TABLET, FILM COATED ORAL at 15:07

## 2023-09-29 NOTE — ANESTHESIA PROCEDURE NOTES
Spinal Block      Patient reassessed immediately prior to procedure    Patient location during procedure: OR  Start Time: 9/29/2023 5:00 PM  Stop Time: 9/29/2023 5:04 PM  Indication:at surgeon's request  Performed By  Anesthesiologist: Chapincito Crandall MD  Preanesthetic Checklist  Completed: patient identified, IV checked, site marked, risks and benefits discussed, surgical consent, monitors and equipment checked, pre-op evaluation and timeout performed  Spinal Block Prep:  Patient Position:sitting  Sterile Tech:cap, gloves, sterile barriers and mask  Prep:Chloraprep  Patient Monitoring:EKG, continuous pulse oximetry and blood pressure monitoring    Spinal Block Procedure  Approach:midline  Guidance:palpation technique  Location:L2-L3  Needle Gauge:25 G  Placement of Spinal needle event:cerebrospinal fluid aspirated  Paresthesia: no  Fluid Appearance:clear  Medications: bupivacaine in dextrose (MARCAINE SPINAL / Hyberbaric) 0.75-8.25 % injection - Intrathecal   1.6 mL - 9/29/2023 5:00:00 PM  fentaNYL citrate (PF) (SUBLIMAZE) injection - Intrathecal   25 mcg - 9/29/2023 5:00:00 PM   Post Assessment  Patient Tolerance:patient tolerated the procedure well with no apparent complications  Complications no

## 2023-09-29 NOTE — H&P
"[facility]  Obstetric History and Physical    Chief Complaint   Patient presents with    Elevated Blood Pressure       Subjective     Patient is a 23 y.o. female  currently at 36w0d, who presents for BP monitoring.  She was seen in the office for a routine PNV and severe range BP noted along with a headache.  She has not tried anything for her HA.  She has CHTN and is on nifedipine XL 30mg po BID and labetalol 200mg po BID.  She is scheduled for a PCS for fetal macrosomia.         The following portions of the patients history were reviewed and updated as appropriate: current medications, allergies, past medical history, past surgical history, past family history, past social history, and problem list .                   Past OB History:       OB History    Para Term  AB Living   1 0 0 0 0 0   SAB IAB Ectopic Molar Multiple Live Births   0 0 0 0 0 0      # Outcome Date GA Lbr Harish/2nd Weight Sex Delivery Anes PTL Lv   1 Current                Past Medical History: Past Medical History:   Diagnosis Date    Anxiety     Attention deficit disorder (ADD)     Autism     Depression     Elevated fasting glucose     History of panic attacks     r/t loss of control as child and teen (went to foster care as teen)    History of suicide attempt     as a teen by overdose of bactrim and other meds- \"probably 10 times\". Pt says it was related to feeling helpless with mom as a child. States she no longer has those thoughts.    Hypertension     chronic    Oppositional defiant disorder     as a teen    PCOS (polycystic ovarian syndrome)     PTSD (post-traumatic stress disorder)     r/t \"trauma induced by mom\"      Past Surgical History Past Surgical History:   Procedure Laterality Date    BARTHOLIN GLAND CYST EXCISION      muliple removed    TONSILLECTOMY      and adenoids as child      Family History: No family history on file.   Social History:  reports that she has quit smoking. Her smoking use included " cigarettes. She has never used smokeless tobacco.   reports that she does not currently use alcohol.   reports no history of drug use.        REVIEW OF SYSTEMS              Reports fetal movement is normal             Denies leakage of amniotic fluid.             Denies vaginal bleeding             She reports No contractions             All other systems reviewed and are negative    Objective       Vital Signs Range for the last 24 hours  Temperature: Temp:  [98.4 °F (36.9 °C)-99 °F (37.2 °C)] 98.4 °F (36.9 °C)   Temp Source: Temp src: Oral   BP: BP: (135-170)/(84-91) 164/91   Pulse: Heart Rate:  [100-102] 102   Respirations: Resp:  [20] 20   SPO2: SpO2:  [96 %-99 %] 96 %   O2 Amount (l/min):     O2 Devices     Weight: Weight:  [168 kg (370 lb)] 168 kg (370 lb)          Fetal Heart Rate Assessment   Method:     Beats/min:     Baseline:  155   Varibility:  moderate   Accels:  15x15   Decels:     Tracing Category:  1     Uterine Assessment   Method:     Frequency (min):  Uterine irritability   Ctx Count in 10 min:     Duration:     Intensity:     Intensity by IUPC:     Resting Tone:     Resting Tone by IUPC:     [place] Units:       Constitutional:  Well developed, well nourished, no acute distress, well-groomed.   Gastrointestinal:  Soft, gravid, nontender.  Uterus: Soft, nontender. Fundus appropriate for dates.  Neurologic:  Alert & oriented x 3,  no focal deficits noted.   Psychiatric:  Speech and behavior appropriate.   Extremities: no cyanosis, clubbing or edema, no evidence of DVT.        Labs:  Lab Results   Component Value Date    WBC 9.80 09/29/2023    HGB 11.4 (L) 09/29/2023    HCT 34.4 09/29/2023    MCV 86.9 09/29/2023     09/29/2023    URICACID 3.8 09/29/2023    AST 20 09/29/2023    ALT 15 09/29/2023     09/29/2023               Assessment & Plan              Assessment:  1.  Intrauterine pregnancy at 36w0d weeks gestation with reactive fetal status.    2.   Severe range BP in office with  headache: headache untreated.    3.  Obstetrical history significant for  CHTN, Class 3 obesity .       Plan:  Fioricet for HA.  Serial BP monitoring.  Normal preE labs.  Discussed need for magnesium and delivery if severe range BPs and persistent HA.    2. Plan of care has been reviewed with patient and questions answered.  3.  Risks, benefits of treatment plan have been discussed.  4.  All questions have been answered.        Qian Reese MD  2023  13:25 EDT     Nelli is a 23-year-old G1 at 36 weeks, 0 days pregnancy who was sent from the office for severe range blood pressure today.  She has a past medical history of chronic hypertension on nifedipine XL 30 mg p.o. twice daily and labetalol 200 mg p.o. twice daily.  In triage she complains of an intermittent headache and possible vision changes.  She denies any right upper quadrant pain and has trace bilateral lower extremity edema.  Her preeclampsia labs are within normal limits and her urine dip for protein was negative.  Her blood pressures in triage remain in the severe range on multiple occasions, with severe range blood pressures over the last 2 hours.  Will give IV labetalol 20 mg x 1 immediately and start on magnesium prophylaxis for chronic hypertension with superimposed preeclampsia.    The plan for delivery for this patient that was previously discussed in the office was a primary  delivery for an estimated fetal weight of 98th percentile.  I discussed suspected fetal macrosomia with the patient and the increased risk of complication with LGA fetuses including, but not limited to, failed vaginal delivery or operative delivery, shoulder dystocia, brachial plexus injury, clavicular fracture, unilateral or bilateral upper extremity injury and paralysis, brain injury, IHIE, fetal death and need for emergent procedures.  We also discussed the risk of  delivery in the setting of morbid obesity with a BMI of 57.9.  We discussed that  she is at increased risk of surgical site infection and wound compromise including but not limited to, fascial dehiscence, need for subsequent repair, surgical packing, wound infection, hematoma and seroma formation, SIRS, sepsis, and in complicated cases even maternal death.  The patient and the father of her baby Akil were extensively counseled, and the patient after an explanation of all risks, benefits, and alternatives to each method of delivery has decided for a primary  delivery.      The risk, benefits, and alternatives to  delivery were discussed with the patient at length including, but not limited to, the risk of bleeding, postpartum hemorrhage, need for blood transfusion and with that the inherent risk of blood-borne pathogens, injury to surrounding structures including the infant, scarring, infection, and need for future procedures.  This was described in plain language to the patient and she voiced understanding.    We will proceed to the operating room for primary  delivery for LGA fetus in the setting of chronic hypertension with superimposed preeclampsia.        Allison H. Canavan, MD, FACOG  Obstetrics and Gynecology  [facility]  Office: [phone]

## 2023-09-29 NOTE — ANESTHESIA PREPROCEDURE EVALUATION
Anesthesia Evaluation     Patient summary reviewed and Nursing notes reviewed                Airway   Mallampati: I  TM distance: >3 FB  Neck ROM: full  No difficulty expected  Dental - normal exam     Pulmonary - negative pulmonary ROS and normal exam   Cardiovascular - normal exam    (+) hypertension      Neuro/Psych- negative ROS  GI/Hepatic/Renal/Endo    (+) obesity, morbid obesity    Musculoskeletal (-) negative ROS    Abdominal   (+) obese    Bowel sounds: normal.   Substance History - negative use     OB/GYN    (+) Pregnant, pregnancy induced hypertension        Other                      Anesthesia Plan    ASA 3 - emergent     spinal       Anesthetic plan, risks, benefits, and alternatives have been provided, discussed and informed consent has been obtained with: patient.    Use of blood products discussed with patient .    Plan discussed with attending.    CODE STATUS:

## 2023-09-29 NOTE — OP NOTE
OPERATIVE REPORT    DATE OF OPERATION: 23    OPERATION(S) PERFORMED: Primary low transverse  delivery     PREOPERATIVE DIAGNOSIS:     @ 36w0d  Suspected fetal macrosomia, estimated fetal weight 98th percentile  Superimposed preeclampsia  Maternal morbid obesity, BMI 57.9    POSTOPERATIVE DIAGNOSIS:     @ 36w0d  Suspected fetal macrosomia, estimated fetal weight 98th percentile  Superimposed preeclampsia  Maternal morbid obesity, BMI 57.9     SURGEON: Dr. Allison H. Canavan    ASSISTANT: Dr. Zainab Ortez for retraction, suture cutting, placement of Torrey retractor, application of fundal pressure due to severe maternal obesity.    SERVICE: OB/GYN    ANESTHESIOLOGIST: Dr. Crandall    COMPLICATIONS: None.    ESTIMATED BLOOD LOSS: 563 mL    FLUIDS: 1500 mL of crystalloid fluid.    URINE: 350 mL, Clear and collected in the Rodriguez bag at the end of the procedure.    OPERATIVE FINDINGS: Viable male infant in the cephalic presentation, weighing 4101 g (9 lb 0.7 oz)  with Apgar scores of         APGARS  One minute Five minutes Ten minutes Fifteen minutes Twenty minutes   Skin color:                 Heart rate:                 Grimace:                  Muscle tone:                  Breathing:                  Totals:                   Normal fallopian tubes bilaterally, normal ovaries bilaterally.  Adipose layer measuring approximately 10 cm.     INDICATION FOR THE PROCEDURE:  Nelli Viera is a   @ 36w0d who underwent a  delivery due to suspected fetal macrosomia with an estimated fetal weight at 98th percentile and increased risk of shoulder dystocia.  She was sent to triage for evaluation of severe range blood pressures in the setting of chronic hypertension.  She developed a persistent headache and was diagnosed with chronic hypertension with superimposed preeclampsia.  After extensive counseling regarding the mode of delivery, the patient desired a   delivery.    Preoperatively, the patient was counseled regarding the risks, benefits, and alternatives to the procedure, including the risks of bleeding, infection, injury, scarring, as well as need for future procedures. We also discussed the risks of abnormal placentation and implications for vaginal births after  delivery in future pregnancies.  The patient verbalized understanding and informed consent was obtained.    OPERATIVE PROCEDURE:    The patient was taken to the operating room, where spinal anesthesia was found be adequate. She was prepped and draped in normal sterile fashion, placed in the dorsal supine position with a leftward tilt.    The Pfannenstiel skin incision was made and carried down to the underlying layer of the fascia. The fascia was incised in the midline and extended laterally using curved Merrill scissors. The superior aspect of the fascial incision was grasped with 2 Kocher clamps, tented up, and dissected off bluntly. Additional sharp dissection was performed at the midline. This was performed as well on the inferior aspect of the fascial incision.  The rectus muscles were gently  and the peritoneum was entered bluntly and extended bluntly using the surgeon's hand. The peritoneum was further extended using the Bovie.      A survey of the abdomen demonstrated a midline uterus.  The fascia was noted to be particularly tight, and a 1 cm extension of the fascia at the midline and is T fashion was performed using the Bovie electrocautery.  No abnormalities were noted. A bladder blade was then used to retract the bladder, which was noted to be in good distance from the level of the planned hysterotomy.  An extra-large Torrey retractor was used for further exposure of the uterus and surrounding structures.    The bladder blade was then reinserted and the uterus was incised in a low transverse fashion with the scalpel. The uterine incision was extended superiorly and inferiorly,  and amniotic fluid was ruptured, clear fluid was noted. The infant's head was brought to the level of the incision without difficulty.  Fundal pressure was not adequate for delivery of the infant due to severe maternal obesity.  A Kiwi vacuum was introduced to the field.  It was placed at the flexion point approximately 2 cm anterior to posterior fontanelle according to the manufactures guidelines.  Gentle traction was used to flex the fetal head which was noted to be in ROT position and provide prompt and gentle delivery.  No pop-off's occurred and the vacuum was placed for approximately 10 seconds in total duration.  The vacuum was promptly removed and the infant was delivered atraumatically, cord was clamped and cut after a 60 second delay, and the infant was handed off to awaiting pediatric team after a cry was noted.  Cord gases were sent.  The placenta was delivered without difficulty, and Pitocin administrated was started.  Uterine atony was noted and the patient was given 1 dose of Hemabate and 1 g of intraoperative tranexamic acid with significant improvement in the uterine tone.  The uterus was exteriorized and cleared of all clots and debris. A survey of the uterus demonstrated findings listed above.  The uterine incision was closed using 0-Vicryl in a running, locked fashion. A second layer of same suture was used to provide excellent hemostasis in a horizontal imbricating fashion.     The uterus was returned to the abdomen and the gutters were cleared of all clots and debris.  The Torrey retractor was removed.  A sweep was performed, which was negative. The fascia was closed using 0-looped PDS in a running fashion with mass closure technique with additional care placed for the closure of the 1 cm extension of the fascia at the midline. Adipose tissue was noted to be approximately 10 cm deep, and it was reapproximated in a subcutaneous layer with plain gut in 3 separate layers. The skin was closed using  Ensorb absorbable staples.    The patient tolerated the procedure well. Sponge, lap, and needle counts were correct x4, and she went to the recovery room in stable condition.    I was present and scrubbed for the entire procedure listed above.    Allison H. Canavan, MD, FACOG  Obstetrics and Gynecology  [facility]  Office: [phone]

## 2023-09-29 NOTE — H&P
Nelli Viera  2000  [id number]  [id number]    CC: elevated BP  HPI:  Patient is 23 y.o. female   currently at 36w0d presents with c/o elevated BP at home.  Pt says her BP's ranged from 155-172/90's on home BP cuff.  Pt has c/o HA off and on past 2 days but currently does not.  Good FM.  PT denies contractions, bleeding, or leaking.     PMH:  Current meds: PNV, procardia 30XL bid, labetalol 200mg bid, cymbalta 60mg/d   Flexeril prn, magnesium 400mg   Illnesses: anxiety, ADHD, migraines, panic attacks, autism spectrum  Surgeries: T and A, I and D bartholin's  Allergies: bactrim- hives       PCN, Amox- childhood       TB skin test    Past OB History:       OB History    Para Term  AB Living   1 0 0 0 0 0   SAB IAB Ectopic Molar Multiple Live Births   0 0 0 0 0 0      # Outcome Date GA Lbr Harish/2nd Weight Sex Delivery Anes PTL Lv   1 Current                     SH: tob vapes with nicotine , EtOH neg, drugs neg      General ROS: HA, D, edema.   All other systems reviewed and are negative.      Physical Examination: General appearance - alert, well appearing, and in no distress  Vital signs - /89   Pulse 102   Temp 99 °F (37.2 °C) (Oral)   Resp 20   SpO2 96%   HEENT: normocephalic, atraumatic,oropharynx clear, appearance of ears and nose normal  Neck - supple, no significant adenopathy, no thyromegaly  Lymphatics - no palpable lymphadenopathy in the neck or groin, no hepatosplenomegaly  Chest - clear to auscultation, no wheezes, rales or rhonchi, respiratory effort non-labored  Heart - normal rate, regular rhythm, no murmurs, rubs, clicks or gallops, no JVD, trace lower extremity edema  Abdomen - soft, nontender, nondistended, no masses, no hepatosplenomegaly  no rebound tenderness noted, bowel sounds normal  Extremities - trace pedal edema noted, no calf tend  Skin -warm and dry, normal coloration and turgor, no rashes, no suspicious skin lesions noted      Labs:  Results  from last 7 days   Lab Units 09/29/23  0133   WBC 10*3/mm3 9.80   HEMOGLOBIN g/dL 11.4*   HEMATOCRIT % 34.4   PLATELETS 10*3/mm3 343     Results from last 7 days   Lab Units 09/27/23  1115   CREATININE mg/dL 0.64   BILIRUBIN mg/dL 0.2   ALK PHOS U/L 103   ALT (SGPT) U/L 15   AST (SGOT) U/L 25     Fetal monitoring: indication HTN , onset 0120 , offset 0204 , baseline 150 , mod BTB variability , multiple accels (15 X 15), no decels, no contractions, interpretation reactive NST    Radiology     Assessment 1)IUP 36 0/7 weeks   2)chron HTN- BP's stable here.  Pt worried her home BP cuff may be off.    Labs normal   3)BMI57    Plan 1)observe   2)home   3)keep appt later today    Cody Hernández MD  9/29/2023  01:56 EDT

## 2023-09-29 NOTE — L&D DELIVERY NOTE
[facility]  Brief  delivery note    Delivery details     Delivery: , Low Transverse     YOB: 2022    Time of Birth: 5:33 PM      Anesthesia: Spinal     Delivering clinician: Allison Canavan    Forceps?   No   Vacuum? No           Delivery narrative:      Please see operative report for complete details.      Infant details    Findings: male  infant     Infant observations: Weight: 4101 g (9 lb 0.7 oz)   Length: 19.5  in   Apgars:   @ 1 minute /      @ 5 minutes     Placenta, Cord, and Fluid    Placenta delivered  Manual removal  at   2023  5:34 PM     Cord: 3 vessels  present.   Nuchal Cord?  no   Cord blood obtained: Yes             Disposition  Mother to Mother Baby/Postpartum  in stable condition currently.  Baby to NBN  in stable condition currently.    Allison H. Canavan, MD, FACOG  Obstetrics and Gynecology  [facility]  Office: [phone]

## 2023-09-30 LAB
BASOPHILS # BLD AUTO: 0.05 10*3/MM3 (ref 0–0.2)
BASOPHILS NFR BLD AUTO: 0.5 % (ref 0–1.5)
DEPRECATED RDW RBC AUTO: 43.1 FL (ref 37–54)
EOSINOPHIL # BLD AUTO: 0.09 10*3/MM3 (ref 0–0.4)
EOSINOPHIL NFR BLD AUTO: 0.9 % (ref 0.3–6.2)
ERYTHROCYTE [DISTWIDTH] IN BLOOD BY AUTOMATED COUNT: 13.5 % (ref 12.3–15.4)
HCT VFR BLD AUTO: 28.5 % (ref 34–46.6)
HGB BLD-MCNC: 9.3 G/DL (ref 12–15.9)
IMM GRANULOCYTES # BLD AUTO: 0.03 10*3/MM3 (ref 0–0.05)
IMM GRANULOCYTES NFR BLD AUTO: 0.3 % (ref 0–0.5)
LYMPHOCYTES # BLD AUTO: 2.1 10*3/MM3 (ref 0.7–3.1)
LYMPHOCYTES NFR BLD AUTO: 21.5 % (ref 19.6–45.3)
MCH RBC QN AUTO: 28.5 PG (ref 26.6–33)
MCHC RBC AUTO-ENTMCNC: 32.6 G/DL (ref 31.5–35.7)
MCV RBC AUTO: 87.4 FL (ref 79–97)
MONOCYTES # BLD AUTO: 0.9 10*3/MM3 (ref 0.1–0.9)
MONOCYTES NFR BLD AUTO: 9.2 % (ref 5–12)
NEUTROPHILS NFR BLD AUTO: 6.62 10*3/MM3 (ref 1.7–7)
NEUTROPHILS NFR BLD AUTO: 67.6 % (ref 42.7–76)
NRBC BLD AUTO-RTO: 0 /100 WBC (ref 0–0.2)
PLATELET # BLD AUTO: 271 10*3/MM3 (ref 140–450)
PMV BLD AUTO: 10.8 FL (ref 6–12)
RBC # BLD AUTO: 3.26 10*6/MM3 (ref 3.77–5.28)
WBC NRBC COR # BLD: 9.79 10*3/MM3 (ref 3.4–10.8)

## 2023-09-30 PROCEDURE — 85025 COMPLETE CBC W/AUTO DIFF WBC: CPT | Performed by: OBSTETRICS & GYNECOLOGY

## 2023-09-30 PROCEDURE — 25010000002 KETOROLAC TROMETHAMINE PER 15 MG: Performed by: OBSTETRICS & GYNECOLOGY

## 2023-09-30 PROCEDURE — 0 MAGNESIUM SULFATE 20 GM/500ML SOLUTION: Performed by: OBSTETRICS & GYNECOLOGY

## 2023-09-30 PROCEDURE — 25010000002 ENOXAPARIN PER 10 MG: Performed by: OBSTETRICS & GYNECOLOGY

## 2023-09-30 RX ORDER — DULOXETIN HYDROCHLORIDE 60 MG/1
60 CAPSULE, DELAYED RELEASE ORAL NIGHTLY
Status: DISCONTINUED | OUTPATIENT
Start: 2023-10-01 | End: 2023-10-03 | Stop reason: HOSPADM

## 2023-09-30 RX ORDER — NIFEDIPINE 30 MG/1
30 TABLET, EXTENDED RELEASE ORAL
Status: DISCONTINUED | OUTPATIENT
Start: 2023-09-30 | End: 2023-10-03 | Stop reason: HOSPADM

## 2023-09-30 RX ORDER — METRONIDAZOLE 500 MG/1
500 TABLET ORAL EVERY 8 HOURS
Status: COMPLETED | OUTPATIENT
Start: 2023-09-30 | End: 2023-10-02

## 2023-09-30 RX ADMIN — ACETAMINOPHEN 1000 MG: 500 TABLET ORAL at 13:34

## 2023-09-30 RX ADMIN — MAGNESIUM SULFATE IN WATER 2 G/HR: 40 INJECTION, SOLUTION INTRAVENOUS at 00:36

## 2023-09-30 RX ADMIN — ENOXAPARIN SODIUM 40 MG: 100 INJECTION SUBCUTANEOUS at 00:36

## 2023-09-30 RX ADMIN — KETOROLAC TROMETHAMINE 15 MG: 15 INJECTION, SOLUTION INTRAMUSCULAR; INTRAVENOUS at 10:50

## 2023-09-30 RX ADMIN — METRONIDAZOLE 500 MG: 500 TABLET ORAL at 09:45

## 2023-09-30 RX ADMIN — MAGNESIUM SULFATE IN WATER 2 G/HR: 40 INJECTION, SOLUTION INTRAVENOUS at 08:54

## 2023-09-30 RX ADMIN — ACETAMINOPHEN 1000 MG: 500 TABLET ORAL at 18:48

## 2023-09-30 RX ADMIN — Medication 1 APPLICATION: at 20:54

## 2023-09-30 RX ADMIN — SODIUM CHLORIDE, POTASSIUM CHLORIDE, SODIUM LACTATE AND CALCIUM CHLORIDE 75 ML/HR: 600; 310; 30; 20 INJECTION, SOLUTION INTRAVENOUS at 10:51

## 2023-09-30 RX ADMIN — METRONIDAZOLE 500 MG: 500 TABLET ORAL at 00:36

## 2023-09-30 RX ADMIN — KETOROLAC TROMETHAMINE 15 MG: 15 INJECTION, SOLUTION INTRAMUSCULAR; INTRAVENOUS at 17:10

## 2023-09-30 RX ADMIN — KETOROLAC TROMETHAMINE 15 MG: 15 INJECTION, SOLUTION INTRAMUSCULAR; INTRAVENOUS at 04:00

## 2023-09-30 RX ADMIN — ACETAMINOPHEN 1000 MG: 500 TABLET ORAL at 00:36

## 2023-09-30 RX ADMIN — NIFEDIPINE 30 MG: 30 TABLET, FILM COATED, EXTENDED RELEASE ORAL at 08:54

## 2023-09-30 RX ADMIN — ACETAMINOPHEN 1000 MG: 500 TABLET ORAL at 07:37

## 2023-09-30 RX ADMIN — KETOROLAC TROMETHAMINE 15 MG: 15 INJECTION, SOLUTION INTRAMUSCULAR; INTRAVENOUS at 22:14

## 2023-09-30 RX ADMIN — DOCUSATE SODIUM 100 MG: 100 CAPSULE, LIQUID FILLED ORAL at 20:54

## 2023-09-30 RX ADMIN — METRONIDAZOLE 500 MG: 500 TABLET ORAL at 18:48

## 2023-09-30 RX ADMIN — PRENATAL VITAMINS-IRON FUMARATE 27 MG IRON-FOLIC ACID 0.8 MG TABLET 1 TABLET: at 08:54

## 2023-09-30 RX ADMIN — ENOXAPARIN SODIUM 40 MG: 100 INJECTION SUBCUTANEOUS at 13:34

## 2023-09-30 NOTE — ANESTHESIA POSTPROCEDURE EVALUATION
Patient: Nelli Viera    Procedure Summary       Date: 23 Room / Location: [facility] LABOR DELIVERY    Anesthesia Start:  Anesthesia Stop:     Procedure:  SECTION PRIMARY (Abdomen) Diagnosis:     Surgeons: Canavan, Allison, MD Provider: Chapincito Crandall MD    Anesthesia Type: spinal ASA Status: 3 - Emergent            Anesthesia Type: spinal    Vitals  Vitals Value Taken Time   /69 23 0849   Temp 98.1 °F (36.7 °C) 23 0346   Pulse 90 23 0849   Resp 16 23 0737   SpO2 99 % 23 0737   Vitals shown include unvalidated device data.        Post Anesthesia Care and Evaluation    Patient location during evaluation: bedside  Patient participation: complete - patient participated  Level of consciousness: awake and awake and alert  Pain score: 0  Pain management: satisfactory to patient    Airway patency: patent  Anesthetic complications: No anesthetic complications  PONV Status: none  Cardiovascular status: acceptable, hemodynamically stable and stable  Respiratory status: acceptable  Hydration status: stable  Post Neuraxial Block status: Motor and sensory function returned to baseline and No signs or symptoms of PDPH

## 2023-09-30 NOTE — PROGRESS NOTES
2023    Name:Nelli Viera    MR#:[id number]     PROGRESS NOTE:  Post-Op 1 S/P        Subjective   23 y.o. yo Female  s/p CS at 36w0d doing well. Pain well controlled, lochia appropriate.  Denies headache, vision changes, RUQ pain.        Term pregnancy        Objective    Vitals  Temp:  Temp:  [97.9 °F (36.6 °C)-98.4 °F (36.9 °C)] 98.1 °F (36.7 °C)  Temp src: Oral  BP:  BP: ()/() 118/60  Pulse:  Heart Rate:  [] 90  RR:   Resp:  [16-20] 16    General Awake, alert, no distress  Abdomen Soft, nondistended, fundus firm, below umbilicus, appropriately tender  Incision  Intact, no erythema or exudate  Extremities Calves NT bilaterally     I/O last 3 completed shifts:  In: 5550.7 [I.V.:4950.7; IV Piggyback:600]  Out: 3917 [Urine:3350; Blood:567]    Lab Results   Component Value Date    WBC 9.79 2023    HGB 9.3 (L) 2023    HCT 28.5 (L) 2023    MCV 87.4 2023     2023    URICACID 4.4 2023    AST 28 2023    ALT 10 2023    HEPBSAG Non-Reactive 2023     Results from last 7 days   Lab Units 23  1518   ABO TYPING  O   RH TYPING  Positive   ANTIBODY SCREEN  Negative       Infant:   male       Assessment  POD 1 from  Section  Chronic hypertension, superimposed preeclampsia on magnesium seizure prophylaxis  Postpartum anemia    Plan: Doing well.    Discontinue IV, advance diet, may shower after completion of magnesium  Patient previously taking home dose antihypertensives nifedipine XL 30mg po BID and labetalol 200mg po BID. blood pressures normalizing postpartum, will continue nifedipine XL 30 mg p.o. daily.  Iron at discharge      Allison Canavan, MD  2023 08:06 EDT

## 2023-10-01 PROCEDURE — 25010000002 ENOXAPARIN PER 10 MG: Performed by: OBSTETRICS & GYNECOLOGY

## 2023-10-01 RX ADMIN — ACETAMINOPHEN 650 MG: 325 TABLET ORAL at 19:00

## 2023-10-01 RX ADMIN — SIMETHICONE 80 MG: 80 TABLET, CHEWABLE ORAL at 11:03

## 2023-10-01 RX ADMIN — METRONIDAZOLE 500 MG: 500 TABLET ORAL at 12:46

## 2023-10-01 RX ADMIN — IBUPROFEN 600 MG: 600 TABLET, FILM COATED ORAL at 22:10

## 2023-10-01 RX ADMIN — ACETAMINOPHEN 650 MG: 325 TABLET ORAL at 12:28

## 2023-10-01 RX ADMIN — ACETAMINOPHEN 650 MG: 325 TABLET ORAL at 00:53

## 2023-10-01 RX ADMIN — OXYCODONE HYDROCHLORIDE 5 MG: 5 TABLET ORAL at 05:21

## 2023-10-01 RX ADMIN — IBUPROFEN 600 MG: 600 TABLET, FILM COATED ORAL at 11:03

## 2023-10-01 RX ADMIN — METRONIDAZOLE 500 MG: 500 TABLET ORAL at 19:00

## 2023-10-01 RX ADMIN — DOCUSATE SODIUM 100 MG: 100 CAPSULE, LIQUID FILLED ORAL at 11:03

## 2023-10-01 RX ADMIN — ENOXAPARIN SODIUM 40 MG: 100 INJECTION SUBCUTANEOUS at 12:28

## 2023-10-01 RX ADMIN — PRENATAL VITAMINS-IRON FUMARATE 27 MG IRON-FOLIC ACID 0.8 MG TABLET 1 TABLET: at 11:03

## 2023-10-01 RX ADMIN — NIFEDIPINE 30 MG: 30 TABLET, FILM COATED, EXTENDED RELEASE ORAL at 11:03

## 2023-10-01 RX ADMIN — DULOXETINE HYDROCHLORIDE 60 MG: 60 CAPSULE, DELAYED RELEASE ORAL at 00:51

## 2023-10-01 RX ADMIN — OXYCODONE HYDROCHLORIDE 10 MG: 10 TABLET ORAL at 12:27

## 2023-10-01 RX ADMIN — ENOXAPARIN SODIUM 40 MG: 100 INJECTION SUBCUTANEOUS at 00:50

## 2023-10-01 RX ADMIN — ACETAMINOPHEN 650 MG: 325 TABLET ORAL at 06:28

## 2023-10-01 RX ADMIN — IBUPROFEN 600 MG: 600 TABLET, FILM COATED ORAL at 04:15

## 2023-10-01 RX ADMIN — METRONIDAZOLE 500 MG: 500 TABLET ORAL at 02:15

## 2023-10-01 RX ADMIN — IBUPROFEN 600 MG: 600 TABLET, FILM COATED ORAL at 16:57

## 2023-10-01 RX ADMIN — SIMETHICONE 80 MG: 80 TABLET, CHEWABLE ORAL at 16:57

## 2023-10-01 NOTE — LACTATION NOTE
10/01/23 1250   Maternal Information   Person Making Referral lactation consultant  (pump for preemie contract provided; pt verbalized understanding of returning hospital pump at baby's discharge; encouraged pumping q3h/at baby's care times for optimal milk production; no additional questions, PRN LC/Outpt Clinic contact encouraged)

## 2023-10-01 NOTE — PROGRESS NOTES
10/1/2023    Name:Nelli Viera    MR#:[id number]     PROGRESS NOTE:  Post-Op 2 S/P        Subjective   23 y.o. yo Female  s/p CS at 36w0d doing well. Pain well controlled, lochia appropriate.  Denies headache, vision changes, RUQ pain. Denies feelings of lightheadedness, weakness or dizziness.            Term pregnancy        Objective    Vitals  Temp:  Temp:  [98.1 °F (36.7 °C)-99.2 °F (37.3 °C)] 99.2 °F (37.3 °C)  Temp src: Oral  BP:  BP: (105-153)/(55-80) 144/76  Pulse:  Heart Rate:  [] 105  RR:   Resp:  [15-16] 16    General Awake, alert, no distress  Abdomen Soft, nondistended, fundus firm, below umbilicus, appropriately tender  Incision  Intact, no erythema or exudate  Extremities Calves NT bilaterally     I/O last 3 completed shifts:  In: 2983.6 [I.V.:2883.6; IV Piggyback:100]  Out: 6150 [Urine:6150]    Lab Results   Component Value Date    WBC 9.79 2023    HGB 9.3 (L) 2023    HCT 28.5 (L) 2023    MCV 87.4 2023     2023    URICACID 4.4 2023    AST 28 2023    ALT 10 2023    HEPBSAG Non-Reactive 2023     Results from last 7 days   Lab Units 23  1518   ABO TYPING  O   RH TYPING  Positive   ANTIBODY SCREEN  Negative         Infant:   male       Assessment  POD 2 from  Section  Chronic hypertension, superimposed preeclampsia on magnesium seizure prophylaxis  Postpartum anemia    Plan: Doing well.    Discontinue IV, advance diet, may shower after completion of magnesium  Patient previously taking home dose antihypertensives nifedipine XL 30mg po BID and labetalol 200mg po BID. blood pressures normalizing postpartum, will continue nifedipine XL 30 mg p.o. daily.  Iron at discharge      Allison Canavan, MD  10/1/2023 09:27 EDT    Copied text in this note has been reviewed and is accurate as of 10/01/23

## 2023-10-01 NOTE — LACTATION NOTE
09/30/23 1630   Maternal Information   Date of Referral 09/30/23   Person Making Referral physician   Maternal Reason for Referral no prior breastfeeding experience   Infant Reason for Referral NICU admission   Milk Expression/Equipment   Breast Pump Type double electric, hospital grade;double electric, personal  (Nursing staff initiated pumping with hospital pump.  Mom has a personal pump at home but she does not remember what kind it is)   Breast Pump Flange Type hard   Breast Pump Flange Size 24 mm   Breast Pumping   Breast Pumping Interventions early pumping promoted;frequent pumping encouraged  (Encouraged to pump every 3 hours to get breastmilk supply possible.  Mom has syringes and knows how to pull up colostrum.  She knows to get that milk to the NICU within 4 hours after pumping and to have it labeled with the baby's sticker.)     Patient very sleepy during consult and may need to have teaching reviewed tomorrow.  Encouraged to call lactation services if mom has questions or concerns.  Gave mom a microwave steam bag and instructed on sterilizing pump parts every 24 hours while baby is in NICU..  Encouraged to watch [employer] online videos, maximizing milk production and hand expression.  These demonstrate how to use hands to help with milk expression.

## 2023-10-01 NOTE — PROGRESS NOTES
Continued Stay Note   [place]     Patient Name: Nelli Viera  MRN: [id number]  Today's Date: 10/1/2023    Admit Date: 9/29/2023        Discharge Plan       Row Name 10/01/23 1655       Plan    Plan Comments Met with the mother of the baby and provided post-partum depression resources to her and she said that she has support when she is discharged with the baby from the hospital.                   Discharge Codes    No documentation.                       ANTONIA Jesus

## 2023-10-01 NOTE — PLAN OF CARE
Goal Outcome Evaluation:      Pt. Is very worn out but was receptive to teaching about falls ( calling nurse if she needed help getting to bathroom, etc) and monitoring bleeding and pain.

## 2023-10-02 PROCEDURE — 25010000002 ENOXAPARIN PER 10 MG: Performed by: OBSTETRICS & GYNECOLOGY

## 2023-10-02 RX ADMIN — NIFEDIPINE 30 MG: 30 TABLET, FILM COATED, EXTENDED RELEASE ORAL at 08:32

## 2023-10-02 RX ADMIN — METRONIDAZOLE 500 MG: 500 TABLET ORAL at 00:12

## 2023-10-02 RX ADMIN — ACETAMINOPHEN 650 MG: 325 TABLET ORAL at 19:21

## 2023-10-02 RX ADMIN — IBUPROFEN 600 MG: 600 TABLET, FILM COATED ORAL at 04:09

## 2023-10-02 RX ADMIN — METRONIDAZOLE 500 MG: 500 TABLET ORAL at 10:00

## 2023-10-02 RX ADMIN — ACETAMINOPHEN 650 MG: 325 TABLET ORAL at 13:17

## 2023-10-02 RX ADMIN — ENOXAPARIN SODIUM 40 MG: 100 INJECTION SUBCUTANEOUS at 13:17

## 2023-10-02 RX ADMIN — ENOXAPARIN SODIUM 40 MG: 100 INJECTION SUBCUTANEOUS at 00:12

## 2023-10-02 RX ADMIN — IBUPROFEN 600 MG: 600 TABLET, FILM COATED ORAL at 15:10

## 2023-10-02 RX ADMIN — IBUPROFEN 600 MG: 600 TABLET, FILM COATED ORAL at 22:22

## 2023-10-02 RX ADMIN — DULOXETINE HYDROCHLORIDE 60 MG: 60 CAPSULE, DELAYED RELEASE ORAL at 00:12

## 2023-10-02 RX ADMIN — OXYCODONE HYDROCHLORIDE 10 MG: 10 TABLET ORAL at 23:41

## 2023-10-02 RX ADMIN — METRONIDAZOLE 500 MG: 500 TABLET ORAL at 17:41

## 2023-10-02 RX ADMIN — ACETAMINOPHEN 650 MG: 325 TABLET ORAL at 06:41

## 2023-10-02 RX ADMIN — ACETAMINOPHEN 650 MG: 325 TABLET ORAL at 00:12

## 2023-10-02 RX ADMIN — PRENATAL VITAMINS-IRON FUMARATE 27 MG IRON-FOLIC ACID 0.8 MG TABLET 1 TABLET: at 08:32

## 2023-10-02 RX ADMIN — IBUPROFEN 600 MG: 600 TABLET, FILM COATED ORAL at 10:00

## 2023-10-02 NOTE — PROGRESS NOTES
10/2/2023    Name:Nelli Viera    MR#:[id number]     PROGRESS NOTE:  Post-Op 3 S/P        Subjective   23 y.o. yo Female  s/p CS at 36w0d doing well. Pain well controlled, lochia appropriate, tolerating diet. Denies HA, visin changes, RUQ pain. She is pumping. Baby in NICU. Would like to stay one more night.       Term pregnancy        Objective    Vitals  Temp:  Temp:  [97.5 °F (36.4 °C)-98.7 °F (37.1 °C)] 98.5 °F (36.9 °C)  Temp src: Axillary  BP:  BP: (138-149)/(59-80) 138/71  Pulse:  Heart Rate:  [] 94  RR:   Resp:  [16-18] 18    General Awake, alert, no distress  Abdomen Soft, non-distended, fundus firm, below umbilicus, appropriately tender  Incision  Intact, no erythema or exudate  Extremities Calves NT bilaterally     I/O last 3 completed shifts:  In: -   Out: 1500 [Urine:1500]    Lab Results   Component Value Date    WBC 9.79 2023    HGB 9.3 (L) 2023    HCT 28.5 (L) 2023    MCV 87.4 2023     2023    URICACID 4.4 2023    AST 28 2023    ALT 10 2023     (H) 2023    HEPBSAG Non-Reactive 2023     Results from last 7 days   Lab Units 23  1518   ABO TYPING  O   RH TYPING  Positive   ANTIBODY SCREEN  Negative       Infant:   male       Assessment   1.  POD 3 from  Section  Chronic hypertension, superimposed preeclampsia on magnesium seizure prophylaxis  Postpartum anemia    Plan: Doing well.    Bps stable on nifedipine XL 30 mg daily.   Plan iron at discharge  Consideration for d/c as clinically indicated.           Liz Barron CNM  10/2/2023 08:18 EDT

## 2023-10-03 VITALS
WEIGHT: 293 LBS | BODY MASS INDEX: 45.99 KG/M2 | DIASTOLIC BLOOD PRESSURE: 82 MMHG | HEIGHT: 67 IN | OXYGEN SATURATION: 99 % | RESPIRATION RATE: 16 BRPM | TEMPERATURE: 98.3 F | SYSTOLIC BLOOD PRESSURE: 132 MMHG | HEART RATE: 97 BPM

## 2023-10-03 PROBLEM — Z34.90 TERM PREGNANCY: Status: RESOLVED | Noted: 2023-09-29 | Resolved: 2023-10-03

## 2023-10-03 LAB
CYTO UR: NORMAL
LAB AP CASE REPORT: NORMAL
LAB AP CLINICAL INFORMATION: NORMAL
PATH REPORT.FINAL DX SPEC: NORMAL
PATH REPORT.GROSS SPEC: NORMAL

## 2023-10-03 PROCEDURE — 25010000002 ENOXAPARIN PER 10 MG: Performed by: OBSTETRICS & GYNECOLOGY

## 2023-10-03 RX ORDER — HYDROCODONE BITARTRATE AND ACETAMINOPHEN 5; 325 MG/1; MG/1
1 TABLET ORAL EVERY 6 HOURS PRN
Qty: 20 TABLET | Refills: 0 | Status: SHIPPED | OUTPATIENT
Start: 2023-10-03

## 2023-10-03 RX ORDER — IBUPROFEN 600 MG/1
600 TABLET ORAL EVERY 6 HOURS PRN
Qty: 30 TABLET | Refills: 2 | Status: SHIPPED | OUTPATIENT
Start: 2023-10-03

## 2023-10-03 RX ORDER — DOCUSATE SODIUM 100 MG/1
100 CAPSULE, LIQUID FILLED ORAL 2 TIMES DAILY PRN
Qty: 30 CAPSULE | Refills: 2 | Status: SHIPPED | OUTPATIENT
Start: 2023-10-03

## 2023-10-03 RX ORDER — SIMETHICONE 80 MG
80 TABLET,CHEWABLE ORAL EVERY 6 HOURS PRN
Qty: 20 TABLET | Refills: 1 | Status: SHIPPED | OUTPATIENT
Start: 2023-10-03

## 2023-10-03 RX ADMIN — DULOXETINE HYDROCHLORIDE 60 MG: 60 CAPSULE, DELAYED RELEASE ORAL at 01:07

## 2023-10-03 RX ADMIN — ENOXAPARIN SODIUM 40 MG: 100 INJECTION SUBCUTANEOUS at 01:08

## 2023-10-03 RX ADMIN — ACETAMINOPHEN 650 MG: 325 TABLET ORAL at 06:43

## 2023-10-03 RX ADMIN — NIFEDIPINE 30 MG: 30 TABLET, FILM COATED, EXTENDED RELEASE ORAL at 07:41

## 2023-10-03 RX ADMIN — ACETAMINOPHEN 650 MG: 325 TABLET ORAL at 01:06

## 2023-10-03 RX ADMIN — PRENATAL VITAMINS-IRON FUMARATE 27 MG IRON-FOLIC ACID 0.8 MG TABLET 1 TABLET: at 07:40

## 2023-10-03 RX ADMIN — OXYCODONE HYDROCHLORIDE 5 MG: 5 TABLET ORAL at 14:25

## 2023-10-03 RX ADMIN — IBUPROFEN 600 MG: 600 TABLET, FILM COATED ORAL at 11:27

## 2023-10-03 RX ADMIN — DOCUSATE SODIUM 100 MG: 100 CAPSULE, LIQUID FILLED ORAL at 07:41

## 2023-10-03 RX ADMIN — IBUPROFEN 600 MG: 600 TABLET, FILM COATED ORAL at 04:37

## 2023-10-03 RX ADMIN — ACETAMINOPHEN 650 MG: 325 TABLET ORAL at 14:25

## 2023-10-03 RX ADMIN — ENOXAPARIN SODIUM 40 MG: 100 INJECTION SUBCUTANEOUS at 07:41

## 2023-10-03 NOTE — DISCHARGE SUMMARY
Date of Discharge:  10/3/2023    Discharge Diagnosis: postpartum, s/p     Presenting Problem/History of Present Illness  Term pregnancy [Z34.90]     Hospital Course  Patient is a 23 y.o. female who was admitted for elevated BP's in the setting of chronic hypertension.  She developed superimposed preE and was delivered by  delivery.  She received magnesium postpartum and was continued on procardia 30 XL daily for BP management.  She had a vaginal delivery and was discharged home on postpartum day #4 following an uncomplicated clinical course.      Procedures Performed  Procedure(s):   SECTION PRIMARY         Labs:  Lab Results   Component Value Date    WBC 9.79 2023    HGB 9.3 (L) 2023    HCT 28.5 (L) 2023    MCV 87.4 2023     2023    URICACID 4.4 2023    AST 28 2023    ALT 10 2023     (H) 2023     Results from last 7 days   Lab Units 23  1518   ABO TYPING  O   RH TYPING  Positive   ANTIBODY SCREEN  Negative       Discharge Disposition:  To Home    Condition on Discharge:  Stable    Vital Signs  Temp:  [98.1 °F (36.7 °C)-98.6 °F (37 °C)] 98.3 °F (36.8 °C)  Heart Rate:  [] 97  Resp:  [16-20] 16  BP: (132-162)/(68-82) 132/82      Physical Exam:   See most recent progress note     Recent Relevant labs:    Lab Results   Component Value Date    WBC 9.79 2023    HGB 9.3 (L) 2023    HCT 28.5 (L) 2023    MCV 87.4 2023     2023     Lab Results   Component Value Date    GLUCOSE 83 2023    BUN 5 (L) 2023    CREATININE 0.65 2023    EGFR 135.1 2023    BCR 11.9 2023    K 4.0 2023    CO2 22.1 2023    CALCIUM 10.0 2023    ALBUMIN 4.0 2023    BILITOT 0.2 2023    AST 2023    ALT 10 2023         Discharge Medications     Discharge Medications        New Medications        Instructions Start Date   docusate sodium 100 MG  capsule  Commonly known as: Colace   100 mg, Oral, 2 Times Daily PRN      HYDROcodone-acetaminophen 5-325 MG per tablet  Commonly known as: NORCO   1 tablet, Oral, Every 6 Hours PRN      ibuprofen 600 MG tablet  Commonly known as: ADVIL,MOTRIN   600 mg, Oral, Every 6 Hours PRN      simethicone 80 MG chewable tablet  Commonly known as: Gas-X   80 mg, Oral, Every 6 Hours PRN             Changes to Medications        Instructions Start Date   NIFEdipine XL 30 MG 24 hr tablet  Commonly known as: Procardia XL  What changed: Another medication with the same name was removed. Continue taking this medication, and follow the directions you see here.   30 mg, Oral, 2 Times Daily             Continue These Medications        Instructions Start Date   cyclobenzaprine 10 MG tablet  Commonly known as: FLEXERIL   10 mg      DULoxetine 60 MG capsule  Commonly known as: CYMBALTA   60 mg, Oral, Daily      freestyle lancets   USE TO CHECK GLUCOSE 4 TIMES DAILY      FreeStyle Lite w/Device kit   See Admin Instructions      prenatal vitamin 27-0.8 27-0.8 MG tablet tablet   1 tablet, Oral, Daily             Stop These Medications      FREESTYLE LITE test strip  Generic drug: glucose blood     labetalol 100 MG tablet  Commonly known as: NORMODYNE     magnesium oxide 400 MG tablet  Commonly known as: MAG-OX              Discharge Diet: Regular     Activity at Discharge: pelvic rest for 6 weeks.     Follow-up Appointments  Recomment outpatient f/u appointment in 6 weeks.         Allison H. Canavan, MD, FACOG  Obstetrics and Gynecology  [facility]  Office: [phone]

## 2023-10-03 NOTE — PROGRESS NOTES
10/3/2023    Name:Nelli Viera    MR#:[id number]     PROGRESS NOTE:  Post-Op 4 S/P        Subjective   23 y.o. yo Female  s/p CS at 36w0d doing well. Pain well controlled, lochia appropriate.  Denies headache, vision changes, RUQ pain. Denies feelings of lightheadedness, weakness or dizziness.   Feeling ready to go home.            Term pregnancy        Objective    Vitals  Temp:  Temp:  [98.1 °F (36.7 °C)-98.6 °F (37 °C)] 98.3 °F (36.8 °C)  Temp src: Oral  BP:  BP: (132-162)/(68-82) 132/82  Pulse:  Heart Rate:  [] 97  RR:   Resp:  [16-20] 16    General Awake, alert, no distress  Abdomen Soft, nondistended, fundus firm, below umbilicus, appropriately tender  Incision  Intact, no erythema or exudate  Extremities Calves NT bilaterally     No intake/output data recorded.    Lab Results   Component Value Date    WBC 9.79 2023    HGB 9.3 (L) 2023    HCT 28.5 (L) 2023    MCV 87.4 2023     2023    URICACID 4.4 2023    AST 28 2023    ALT 10 2023    HEPBSAG Non-Reactive 2023     Results from last 7 days   Lab Units 23  1518   ABO TYPING  O   RH TYPING  Positive   ANTIBODY SCREEN  Negative         Infant:   male       Assessment  POD 2 from  Section  Chronic hypertension, superimposed preeclampsia s/p magnesium seizure prophylaxis  Postpartum anemia    Plan: Doing well.    Discontinue IV, advance diet, continue RPOC   Continue nifedipine XL 30 mg p.o. daily.  Iron at discharge  F/u 1 week for BP check, 2 weeks for incision check      Allison Canavan, MD  10/3/2023 11:57 EDT    Copied text in this note has been reviewed and is accurate as of 10/03/23

## 2024-06-18 ENCOUNTER — OFFICE VISIT (OUTPATIENT)
Dept: CARDIOLOGY | Facility: CLINIC | Age: 24
End: 2024-06-18
Payer: COMMERCIAL

## 2024-06-18 VITALS
HEIGHT: 67 IN | DIASTOLIC BLOOD PRESSURE: 88 MMHG | WEIGHT: 293 LBS | OXYGEN SATURATION: 98 % | BODY MASS INDEX: 45.99 KG/M2 | HEART RATE: 81 BPM | SYSTOLIC BLOOD PRESSURE: 130 MMHG

## 2024-06-18 DIAGNOSIS — G47.10 HYPERSOMNIA: Primary | ICD-10-CM

## 2024-06-18 PROCEDURE — 99204 OFFICE O/P NEW MOD 45 MIN: CPT | Performed by: NURSE PRACTITIONER

## 2024-06-18 PROCEDURE — 3075F SYST BP GE 130 - 139MM HG: CPT | Performed by: NURSE PRACTITIONER

## 2024-06-18 PROCEDURE — 1159F MED LIST DOCD IN RCRD: CPT | Performed by: NURSE PRACTITIONER

## 2024-06-18 PROCEDURE — 3079F DIAST BP 80-89 MM HG: CPT | Performed by: NURSE PRACTITIONER

## 2024-06-18 PROCEDURE — 1160F RVW MEDS BY RX/DR IN RCRD: CPT | Performed by: NURSE PRACTITIONER

## 2024-06-18 RX ORDER — BUPROPION HYDROCHLORIDE 150 MG/1
150 TABLET ORAL DAILY
COMMUNITY

## 2024-06-18 RX ORDER — BUSPIRONE HYDROCHLORIDE 15 MG/1
15 TABLET ORAL 3 TIMES DAILY
COMMUNITY

## 2024-06-18 NOTE — PROGRESS NOTES
New Sleep Consult     Date:   2024  Name: Nelli Viera  :   2000  PCP: Mary Ellen Ortiz APRN    Chief Complaint   Patient presents with   • Establish Care     Neck size:        Subjective     History of Present Illness  Nelli Viera is a 23 y.o. female who presents today for evaluation of sleep apnea.    Patient states that she stops breathing in her sleep.  Her  tells her she snores really bad and loud.  She states that her sleep is nonrestorative.  She reports complaints of excessive daytime sleepiness and fatigue.  She also states that she has problems sleeping and can stay awake for 2 to 3 days and then 24 hours straight she can sleep.  She has frequent nightmares and jolting herself awake.  She has been told that she has conversations with people during her sleep but does not remember having the conversation.  She also states that she had a PSG/MSLT at [facility] in [place] that was nondiagnostic in 2019.    No specialty comments available.    Further details are as follows:    Neck Measurement: 18 inches    Washington Scale is (out of 24): 8      Estimated average amount of sleep per night: 8 She sleeps during the day because her  is on night shift.  Number of times she wakes up at night: 5  Difficulty falling back asleep: no  It usually takes 60 minutes to go to sleep.  She feels sleepy upon waking up: yes  Rotating or night shift work: yes    Drowsiness/Sleepiness:  She exhibits the following:  excessive daytime sleepiness  excessive daytime fatigue  sleepy even while on vacation  sleepy even when sleep time is increased    Snoring/Breathing:  She exhibits the following:  loud snoring, snores in all sleep positions, quits breathing at night, awakens with dry mouth, awakens gasping for breath, sore throat when waking up in the morning, trouble breathing through nose at night, trouble breathing through nose during the day, and morning  "headaches    Head Injury:  She exhibits the following:  No    Reflux:  She describes the following:  Only during pregnancy    Narcolepsy:  She exhibits the following:  feeling of paralysis while going to sleep or coming out of sleep.  She has episodes of of feeling like she is falling and can't move.  visual hallucinations while falling asleep    RLS/PLMs:  She describes the following:  moves or jerks during sleep  discomfort in legs with an urge to move them    Insomnia:  She describes the following:  problems initiating sleep at night  frequent awakenings  restless sleep    Parasomnia:  She exhibits the following:  sleep talks  acts out dreams  wakes up screaming at night  frequent nightmares  excessive sweating while sleeping    Weight:       24  1222   Weight: (!) 158 kg (349 lb 1.6 oz)      Weight change in the last year:  gain: 30lbs    The patient's relevant past medical, surgical, family, and social history reviewed and updated in Epic as appropriate.    Past Medical History:   Diagnosis Date   • Anxiety    • Attention deficit disorder (ADD)    • Autism    • Depression    • Elevated fasting glucose    • History of panic attacks     r/t loss of control as child and teen (went to foster care as teen)   • History of suicide attempt     as a teen by overdose of bactrim and other meds- \"probably 10 times\". Pt says it was related to feeling helpless with mom as a child. States she no longer has those thoughts.   • Hypertension     chronic   • Oppositional defiant disorder     as a teen   • PCOS (polycystic ovarian syndrome)    • PTSD (post-traumatic stress disorder)     r/t \"trauma induced by mom\"     Past Surgical History:   Procedure Laterality Date   • BARTHOLIN GLAND CYST EXCISION      muliple removed   •  SECTION N/A 2023    Procedure:  SECTION PRIMARY;  Surgeon: Canavan, Allison, MD;  Location: [facility] LABOR DELIVERY;  Service: Obstetrics/Gynecology;  Laterality: N/A;   • " TONSILLECTOMY      and adenoids as child     OB History          2    Para   1    Term   0       1    AB   0    Living   1         SAB   0    IAB   0    Ectopic   0    Molar   0    Multiple   0    Live Births   1              Allergies   Allergen Reactions   • Bactrim [Sulfamethoxazole-Trimethoprim] Hives   • Adhesive Tape Rash   • Amoxicillin Other (See Comments)     As a child   • Other Rash     TB SERUM   • Penicillins Other (See Comments)     States went to hospital and they wouldn't give it to her for strep      Prior to Admission medications    Medication Sig Start Date End Date Taking? Authorizing Provider   Blood Glucose Monitoring Suppl (FreeStyle Lite) w/Device kit See Admin Instructions. 23   Wilder Colmenares MD   cyclobenzaprine (FLEXERIL) 10 MG tablet 1 tablet. 23   Wilder Colmenares MD   docusate sodium (Colace) 100 MG capsule Take 1 capsule by mouth 2 (Two) Times a Day As Needed for Constipation. 10/3/23   Canavan, Allison, MD   DULoxetine (CYMBALTA) 60 MG capsule Take 1 capsule by mouth Daily. 23   Wilder Colmenares MD   HYDROcodone-acetaminophen (NORCO) 5-325 MG per tablet Take 1 tablet by mouth Every 6 (Six) Hours As Needed for Moderate Pain. 10/3/23   Canavan, Allison, MD   ibuprofen (ADVIL,MOTRIN) 600 MG tablet Take 1 tablet by mouth Every 6 (Six) Hours As Needed for Mild Pain. 10/3/23   Canavan, Allison, MD   Lancets (freestyle) lancets USE TO CHECK GLUCOSE 4 TIMES DAILY 23   Wilder Colmenares MD   NIFEdipine XL (Procardia XL) 30 MG 24 hr tablet Take 1 tablet by mouth 2 (Two) Times a Day. 23   Ramila Mc,    Prenatal Vit-Fe Fumarate-FA (prenatal vitamin 27-0.8) 27-0.8 MG tablet tablet Take 1 tablet by mouth Daily. 5/10/23   Wilder Colmenares MD   simethicone (Gas-X) 80 MG chewable tablet Chew 1 tablet Every 6 (Six) Hours As Needed for Flatulence. 10/3/23   Canavan, Allison, MD     History reviewed. No pertinent family  "history.    Objective     Vital Signs:  /88   Pulse 81   Ht 170.2 cm (67\")   Wt (!) 158 kg (349 lb 1.6 oz)   SpO2 98%   BMI 54.68 kg/m²     Class 3 Severe Obesity (BMI >=40). Obesity-related health conditions include the following: none. Obesity is unchanged. BMI is is above average; no BMI management plan is appropriate. We discussed portion control, increasing exercise, and patient is 5 weeks pregnant .        Physical Exam  Constitutional:       Appearance: Normal appearance. She is obese.   Pulmonary:      Effort: Pulmonary effort is normal.   Skin:     General: Skin is warm and dry.   Neurological:      General: No focal deficit present.      Mental Status: She is alert and oriented to person, place, and time.   Psychiatric:         Mood and Affect: Mood normal.         Behavior: Behavior normal.         The following data was reviewed by: PAOLA Cardona on 06/18/2024:    PSG/MSLT at [facility] in 2019 has been reviewed.    Referral note from Mary Ellen Mills has been reviewed.         Assessment and Plan     Nelli Viera is a 23 y.o. female who presents for further evaluation of excessive daytime sleepiness and fatigue, nonrestorative sleep, and concerns for sleep disordered breathing and obstructive sleep apnea.  We will obtain testing for further evaluation.  The patient will return for follow-up and recommendations after test.  I have discussed weight loss as it pertains to obstructive sleep apnea.    Diagnoses and all orders for this visit:    1. Hypersomnia (Primary)  Assessment & Plan:  Patient reports nonrestorative sleep.  She states that she goes several days without sleeping and then sleeps whole day away.  Patient states that she sleeps during the day and is up at night to keep on her 's night shift schedule.  Patient states that she goes to bed at 8 AM and sleeps till 11 PM and that is on a good day.  Her  tells her that she " snores and quits breathing during the night.  She states that she wakes up sometimes gasping for air.  She has frequent nightmares and can talk and carry on conversations with others in her sleep.  Patient also states she is about 5 weeks pregnant.  Will plan home sleep study for evaluation of sleep apnea.    Follow-up in 4 weeks to discuss HST results.    Orders:  -     Home Sleep Study; Future        I discussed the consequences of uncontrolled sleep apnea including hypertension, heart disease, diabetes, stroke, and dementia. I further discussed sleep apnea therapeutic options including CPAP, Weight loss, Oral dental appliance, and surgery.    Report if any new/changing symptoms immediately, Sleep risks reviewed (driving, medical, sleep death, sedating agents), and Sleep hygiene discussed         Follow Up  Return in about 4 weeks (around 7/16/2024) for HST  results.  Patient was given instructions and counseling regarding her condition or for health maintenance advice. Please see specific information pulled into the AVS if appropriate.

## 2024-06-18 NOTE — ASSESSMENT & PLAN NOTE
Patient reports nonrestorative sleep.  She states that she goes several days without sleeping and then sleeps whole day away.  Patient states that she sleeps during the day and is up at night to keep on her 's night shift schedule.  Patient states that she goes to bed at 8 AM and sleeps till 11 PM and that is on a good day.  Her  tells her that she snores and quits breathing during the night.  She states that she wakes up sometimes gasping for air.  She has frequent nightmares and can talk and carry on conversations with others in her sleep.  Patient also states she is about 5 weeks pregnant.  Will plan home sleep study for evaluation of sleep apnea.    Follow-up in 4 weeks to discuss HST results.

## 2024-07-08 DIAGNOSIS — G47.10 HYPERSOMNIA: ICD-10-CM

## 2024-07-15 ENCOUNTER — OFFICE VISIT (OUTPATIENT)
Dept: CARDIOLOGY | Facility: CLINIC | Age: 24
End: 2024-07-15
Payer: COMMERCIAL

## 2024-07-15 VITALS
HEIGHT: 67 IN | WEIGHT: 293 LBS | DIASTOLIC BLOOD PRESSURE: 74 MMHG | BODY MASS INDEX: 45.99 KG/M2 | HEART RATE: 88 BPM | OXYGEN SATURATION: 98 % | SYSTOLIC BLOOD PRESSURE: 140 MMHG

## 2024-07-15 DIAGNOSIS — G47.33 OSA (OBSTRUCTIVE SLEEP APNEA): Primary | ICD-10-CM

## 2024-07-15 DIAGNOSIS — G47.10 HYPERSOMNIA: ICD-10-CM

## 2024-07-15 PROCEDURE — 3078F DIAST BP <80 MM HG: CPT | Performed by: NURSE PRACTITIONER

## 2024-07-15 PROCEDURE — 1160F RVW MEDS BY RX/DR IN RCRD: CPT | Performed by: NURSE PRACTITIONER

## 2024-07-15 PROCEDURE — 3077F SYST BP >= 140 MM HG: CPT | Performed by: NURSE PRACTITIONER

## 2024-07-15 PROCEDURE — 1159F MED LIST DOCD IN RCRD: CPT | Performed by: NURSE PRACTITIONER

## 2024-07-15 PROCEDURE — 99213 OFFICE O/P EST LOW 20 MIN: CPT | Performed by: NURSE PRACTITIONER

## 2024-07-15 RX ORDER — MAGNESIUM L-LACTATE 84 MG
84 TABLET, EXTENDED RELEASE ORAL DAILY
COMMUNITY

## 2024-07-15 RX ORDER — HYDROXYZINE PAMOATE 25 MG/1
1 CAPSULE ORAL 3 TIMES DAILY
COMMUNITY
Start: 2024-07-09

## 2024-07-15 RX ORDER — LABETALOL 100 MG/1
100 TABLET, FILM COATED ORAL
COMMUNITY
Start: 2024-07-10

## 2024-07-15 NOTE — PROGRESS NOTES
"    Follow-Up Sleep Consult     Date:   07/15/2024  Name: Nelli Viera  :   2000  PCP: Mary Ellen Ortiz APRN    Chief Complaint   Patient presents with    Sleep Apnea     HST RESULTS       Subjective     History of Present Illness  Nelli Viera is a 23 y.o. female who presents today for follow-up on OMAR.    OMAR History:    HST 24 Baseline AHI 9      The patient's relevant past medical, surgical, family, and social history reviewed and updated in Epic as appropriate.    Past Medical History:   Diagnosis Date    Anxiety     Attention deficit disorder (ADD)     Autism     Depression     Elevated fasting glucose     History of panic attacks     r/t loss of control as child and teen (went to foster care as teen)    History of suicide attempt     as a teen by overdose of bactrim and other meds- \"probably 10 times\". Pt says it was related to feeling helpless with mom as a child. States she no longer has those thoughts.    Hypertension     chronic    Oppositional defiant disorder     as a teen    PCOS (polycystic ovarian syndrome)     PTSD (post-traumatic stress disorder)     r/t \"trauma induced by mom\"     Past Surgical History:   Procedure Laterality Date    BARTHOLIN GLAND CYST EXCISION      muliple removed     SECTION N/A 2023    Procedure:  SECTION PRIMARY;  Surgeon: Canavan, Allison, MD;  Location: [facility] LABOR DELIVERY;  Service: Obstetrics/Gynecology;  Laterality: N/A;    TONSILLECTOMY      and adenoids as child     OB History          2    Para   1    Term   0       1    AB   0    Living   1         SAB   0    IAB   0    Ectopic   0    Molar   0    Multiple   0    Live Births   1              Allergies   Allergen Reactions    Bactrim [Sulfamethoxazole-Trimethoprim] Hives    Adhesive Tape Rash    Amoxicillin Other (See Comments)     As a child    Other Rash     TB SERUM    Penicillins Other (See Comments)     States went to hospital and they " "wouldn't give it to her for strep      Prior to Admission medications    Medication Sig Start Date End Date Taking? Authorizing Provider   buPROPion XL (WELLBUTRIN XL) 150 MG 24 hr tablet Take 1 tablet by mouth Daily.    Wilder Colmenares MD   busPIRone (BUSPAR) 15 MG tablet Take 1 tablet by mouth 3 (Three) Times a Day.    Wilder Colmenares MD   DULoxetine (CYMBALTA) 60 MG capsule Take 1 capsule by mouth Daily. 4/26/23   Wilder Colmenares MD     History reviewed. No pertinent family history.    Objective     Vital Signs:  /74   Pulse 88   Ht 170.2 cm (67\")   Wt (!) 161 kg (355 lb)   SpO2 98%   BMI 55.60 kg/m²              Physical Exam                 Assessment and Plan     Diagnoses and all orders for this visit:    1. OMAR (obstructive sleep apnea) (Primary)  -     PAP Therapy                 Follow Up  Return in about 6 weeks (around 8/26/2024) for OMAR compliance .  Patient was given instructions and counseling regarding her condition or for health maintenance advice. Please see specific information pulled into the AVS if appropriate.  "

## 2024-07-15 NOTE — ASSESSMENT & PLAN NOTE
Patient has a baseline AHI 9.  This is mild sleep apnea.  Treatment options discussed with patient.  Sleep risk and hygiene reviewed.  Patient verbalized understanding and wishes to proceed with pap therapy.  She does not drive and would like a DME that is close.  Order for ResMed Acpap 6-20cm with supplies has been sent to DME of patient choice.

## 2024-07-15 NOTE — ASSESSMENT & PLAN NOTE
Patient states she is pregnant and she has not been sleeping well.  She stays on her boyfriends schedule and he works night shift.  She says she has issues initiating sleep.  She states that she snores.  She reports non-restorative sleep.  She complains of daytime sleepiness and fatigue.  She takes frequent naps during the day.  Plan a trial of Cpap therapy.

## (undated) DEVICE — TRAP FLD MINIVAC MEGADYNE 100ML

## (undated) DEVICE — SUT VIC 3/0 CT1 27IN DYED J338H

## (undated) DEVICE — TRY SPINE BLCK WHITACRE 25G 3X5IN

## (undated) DEVICE — PK C/SECT 10

## (undated) DEVICE — COATED VICRYL  (POLYGLACTIN 910) SUTURE, VIOLET BRAIDED, STERILE, SYNTHETIC ABSORBABLE SUTURE: Brand: COATED VICRYL

## (undated) DEVICE — SUT MONOCRYL SZ 4 0 18IN PS1 Y682H BX/36

## (undated) DEVICE — SUT GUT CHRM 1 CTX 36IN 905H

## (undated) DEVICE — EXTRA LARGE, DISPOSABLE C-SECTION PROTECTOR - RETRACTOR: Brand: ALEXIS ® O C-SECTION PROTECTOR - RETRACTOR

## (undated) DEVICE — SUT GUT CHRM 2/0 CT1 27IN 811H

## (undated) DEVICE — GLV SURG BIOGEL LTX PF 6

## (undated) DEVICE — PATIENT RETURN ELECTRODE, SINGLE-USE, CONTACT QUALITY MONITORING, ADULT, WITH 9FT CORD, FOR PATIENTS WEIGING OVER 33LBS. (15KG): Brand: MEGADYNE

## (undated) DEVICE — GLV SURG BIOGEL LTX PF 6 1/2

## (undated) DEVICE — MAT PREVALON MOBL TRANSFR AIR W/PAD REPROC 39X81IN

## (undated) DEVICE — KIWI® VACUUM DELIVERY SYSTEM, OMNICUP®: Brand: KIWI® OMNICUP®

## (undated) DEVICE — APPL CHLORAPREP TINTED 26ML TEAL

## (undated) DEVICE — TBG PENCL TELESCP MEGADYNE SMOKE EVAC 10FT

## (undated) DEVICE — 4-PORT MANIFOLD: Brand: NEPTUNE 2

## (undated) DEVICE — SOL IRR NACL 0.9PCT BT 1000ML

## (undated) DEVICE — CLTH CLENS READYCLEANSE PERI CARE PK/5

## (undated) DEVICE — SOL IRR H2O BTL 1000ML STRL

## (undated) DEVICE — ADHS SKIN PREMIERPRO EXOFIN TOPICAL HI/VISC .5ML